# Patient Record
Sex: FEMALE | Race: OTHER | Employment: OTHER | URBAN - METROPOLITAN AREA
[De-identification: names, ages, dates, MRNs, and addresses within clinical notes are randomized per-mention and may not be internally consistent; named-entity substitution may affect disease eponyms.]

---

## 2017-02-27 ENCOUNTER — PATIENT MESSAGE (OUTPATIENT)
Dept: FAMILY MEDICINE CLINIC | Age: 57
End: 2017-02-27

## 2017-02-27 RX ORDER — LISINOPRIL 2.5 MG/1
2.5 TABLET ORAL DAILY
Qty: 90 TAB | Refills: 0 | Status: SHIPPED | OUTPATIENT
Start: 2017-02-27 | End: 2017-05-22 | Stop reason: SDUPTHER

## 2017-02-27 NOTE — LETTER
3/13/2017 7:54 AM 
 
Ms. [address] [place] [id number] Mrs. Motley Bright  
Dr. Mariano Strong authorize your prescription, however you are overdue for an office visit, particularly because we started a new diabetes medication back in May of 2016, and we needed to follow-up on that 3mo afterwards (Aug 2016).    
Please call to schedule an appointment. [phone] Sincerely, Priya Diaz LPN

## 2017-02-27 NOTE — TELEPHONE ENCOUNTER
Please call Dagoberto Mcdonnell. I did authorize her prescription, however she is overdue for an office visit, particularly because we started a new diabetes medication back in May of 2016, and we needed to follow-up on that 3mo afterwards (Aug 2016!!). Please ask her to schedule an appointment. Orders Placed This Encounter    lisinopril (PRINIVIL, ZESTRIL) 2.5 mg tablet     Sig: Take 1 Tab by mouth daily. Dispense:  90 Tab     Refill:  0     Needs office visit for further refills. No future appointments.

## 2017-05-22 DIAGNOSIS — F41.9 ANXIETY AND DEPRESSION: ICD-10-CM

## 2017-05-22 DIAGNOSIS — E11.9 DIABETES MELLITUS TYPE 2, CONTROLLED (HCC): ICD-10-CM

## 2017-05-22 DIAGNOSIS — F32.A ANXIETY AND DEPRESSION: ICD-10-CM

## 2017-05-22 RX ORDER — TRAZODONE HYDROCHLORIDE 50 MG/1
50-100 TABLET ORAL
Qty: 180 TAB | Refills: 1 | Status: SHIPPED | OUTPATIENT
Start: 2017-05-22 | End: 2019-07-01

## 2017-05-22 RX ORDER — GLIPIZIDE 10 MG/1
10 TABLET ORAL 2 TIMES DAILY
Qty: 180 TAB | Refills: 1 | Status: SHIPPED | OUTPATIENT
Start: 2017-05-22 | End: 2017-05-24 | Stop reason: SDUPTHER

## 2017-05-22 RX ORDER — LISINOPRIL 2.5 MG/1
2.5 TABLET ORAL DAILY
Qty: 90 TAB | Refills: 1 | Status: SHIPPED | OUTPATIENT
Start: 2017-05-22 | End: 2017-05-24 | Stop reason: SDUPTHER

## 2017-05-22 RX ORDER — ATORVASTATIN CALCIUM 40 MG/1
40 TABLET, FILM COATED ORAL DAILY
Qty: 90 TAB | Refills: 1 | Status: SHIPPED | OUTPATIENT
Start: 2017-05-22 | End: 2017-05-24 | Stop reason: SDUPTHER

## 2017-05-22 RX ORDER — METFORMIN HYDROCHLORIDE 1000 MG/1
1000 TABLET ORAL 2 TIMES DAILY WITH MEALS
Qty: 180 TAB | Refills: 1 | Status: SHIPPED | OUTPATIENT
Start: 2017-05-22 | End: 2017-05-24 | Stop reason: SDUPTHER

## 2017-05-22 NOTE — TELEPHONE ENCOUNTER
Orders Placed This Encounter    traZODone (DESYREL) 50 mg tablet     Sig: Take 1-2 Tabs by mouth nightly. Dispense:  180 Tab     Refill:  1     Needs appt for further refills    atorvastatin (LIPITOR) 40 mg tablet     Sig: Take 1 Tab by mouth daily. Dispense:  90 Tab     Refill:  1     Needs appt for further refills    glipiZIDE (GLUCOTROL) 10 mg tablet     Sig: Take 1 Tab by mouth two (2) times a day. Dispense:  180 Tab     Refill:  1     Needs appt for further refills    metFORMIN (GLUCOPHAGE) 1,000 mg tablet     Sig: Take 1 Tab by mouth two (2) times daily (with meals). Dispense:  180 Tab     Refill:  1     Needs appt for further refills    lisinopril (PRINIVIL, ZESTRIL) 2.5 mg tablet     Sig: Take 1 Tab by mouth daily. Dispense:  90 Tab     Refill:  1     Needs office visit for further refills.

## 2017-05-22 NOTE — TELEPHONE ENCOUNTER
Last seen 5/27/16    Last filled Lisinopril 2/27/17  Glipizide & Metformin 9/12/16  Lipitor 7/20/16  Trazodone 6/17/16

## 2017-05-22 NOTE — TELEPHONE ENCOUNTER
From: Ar Currie  To: Viviana Porter MD  Sent: 5/22/2017 9:30 AM EDT  Subject: Medication Renewal Request    Original authorizing provider: MD Ar Lizama would like a refill of the following medications:  traZODone (DESYREL) 50 mg tablet [Terri Lee MD]  atorvastatin (LIPITOR) 40 mg tablet [Terri Lee MD]  glipiZIDE (GLUCOTROL) 10 mg tablet [Terri Lee MD]  metFORMIN (GLUCOPHAGE) 1,000 mg tablet [Terri Lee MD]  lisinopril (PRINIVIL, ZESTRIL) 2.5 mg tablet Viviana Porter MD]    Preferred pharmacy: [facility] [address] [place] [address].     Comment:

## 2017-05-24 RX ORDER — GLIPIZIDE 10 MG/1
10 TABLET ORAL 2 TIMES DAILY
Qty: 60 TAB | Refills: 0 | Status: SHIPPED | OUTPATIENT
Start: 2017-05-24 | End: 2022-09-16 | Stop reason: ALTCHOICE

## 2017-05-24 RX ORDER — LISINOPRIL 2.5 MG/1
2.5 TABLET ORAL DAILY
Qty: 30 TAB | Refills: 0 | Status: SHIPPED | OUTPATIENT
Start: 2017-05-24 | End: 2019-07-01

## 2017-05-24 RX ORDER — ATORVASTATIN CALCIUM 40 MG/1
40 TABLET, FILM COATED ORAL DAILY
Qty: 30 TAB | Refills: 0 | Status: SHIPPED | OUTPATIENT
Start: 2017-05-24

## 2017-05-24 RX ORDER — METFORMIN HYDROCHLORIDE 1000 MG/1
1000 TABLET ORAL 2 TIMES DAILY WITH MEALS
Qty: 60 TAB | Refills: 0 | Status: SHIPPED | OUTPATIENT
Start: 2017-05-24 | End: 2022-09-15 | Stop reason: ALTCHOICE

## 2017-05-24 NOTE — TELEPHONE ENCOUNTER
Called patient to inform her that her medications are in and to get fasting blood work done before her appointment that is on 06/01/2017. Patient didn't answer. Voicemail left stating to give us a call back at her earliest convenience at [phone].

## 2017-05-24 NOTE — TELEPHONE ENCOUNTER
Please call Elias Nelly. A 30d supply of her medications has been authorized. Please ask her to come for FASTING bloodwork prior to her scheduled appointment. Orders have been placed for labs. Future Appointments  Date Time Provider Jacklyn Gifford   6/1/2017 8:00 AM MD LJ Bloom SCHED       Orders Placed This Encounter    atorvastatin (LIPITOR) 40 mg tablet     Sig: Take 1 Tab by mouth daily. Dispense:  30 Tab     Refill:  0     Needs appt for further refills    metFORMIN (GLUCOPHAGE) 1,000 mg tablet     Sig: Take 1 Tab by mouth two (2) times daily (with meals). Dispense:  60 Tab     Refill:  0     Needs appt for further refills    glipiZIDE (GLUCOTROL) 10 mg tablet     Sig: Take 1 Tab by mouth two (2) times a day. Dispense:  60 Tab     Refill:  0     Needs appt for further refills    lisinopril (PRINIVIL, ZESTRIL) 2.5 mg tablet     Sig: Take 1 Tab by mouth daily. Dispense:  30 Tab     Refill:  0     Needs office visit for further refills.

## 2017-05-31 LAB
25(OH)D3 SERPL-MCNC: 18.1 NG/ML (ref 32–100)
A-G RATIO,AGRAT: 2 RATIO (ref 1.1–2.6)
ALBUMIN SERPL-MCNC: 4.1 G/DL (ref 3.5–5)
ALP SERPL-CCNC: 142 U/L (ref 25–115)
ALT SERPL-CCNC: 57 U/L (ref 5–40)
ANION GAP SERPL CALC-SCNC: 15 MMOL/L
AST SERPL W P-5'-P-CCNC: 42 U/L (ref 10–37)
AVG GLU, 10930: 212 MG/DL (ref 91–123)
BILIRUB SERPL-MCNC: 0.2 MG/DL (ref 0.2–1.2)
BUN SERPL-MCNC: 17 MG/DL (ref 6–22)
CALCIUM SERPL-MCNC: 9.3 MG/DL (ref 8.4–10.5)
CHLORIDE SERPL-SCNC: 104 MMOL/L (ref 98–110)
CHOLEST SERPL-MCNC: 135 MG/DL (ref 110–200)
CO2 SERPL-SCNC: 23 MMOL/L (ref 20–32)
CREAT SERPL-MCNC: 0.5 MG/DL (ref 0.5–1.2)
GFRAA, 66117: >60
GFRNA, 66118: >60
GLOBULIN,GLOB: 2.1 G/DL (ref 2–4)
GLUCOSE SERPL-MCNC: 128 MG/DL (ref 65–99)
HBA1C MFR BLD HPLC: 9 % (ref 4.8–5.9)
HDLC SERPL-MCNC: 35 MG/DL (ref 40–59)
LDLC SERPL CALC-MCNC: 70 MG/DL (ref 50–99)
POTASSIUM SERPL-SCNC: 4.5 MMOL/L (ref 3.5–5.5)
PROT SERPL-MCNC: 6.2 G/DL (ref 6.4–8.3)
SODIUM SERPL-SCNC: 142 MMOL/L (ref 133–145)
T4 FREE SERPL-MCNC: 1 NG/DL (ref 0.9–1.8)
TRIGL SERPL-MCNC: 153 MG/DL (ref 40–149)
TSH SERPL DL<=0.005 MIU/L-ACNC: 2.66 MCU/ML (ref 0.27–4.2)
VLDLC SERPL CALC-MCNC: 31 MG/DL (ref 8–30)

## 2017-08-29 NOTE — PROGRESS NOTES
I was reviewing chart to see if an appointment has been rescheduled. Her chart has now been inactivated in [place]. I will no longer try to make contact with this patient.

## 2019-05-15 ENCOUNTER — HOSPITAL ENCOUNTER (OUTPATIENT)
Dept: MAMMOGRAPHY | Age: 59
Discharge: HOME | End: 2019-05-15
Attending: NURSE PRACTITIONER
Payer: COMMERCIAL

## 2019-05-15 DIAGNOSIS — Z12.31 VISIT FOR SCREENING MAMMOGRAM: ICD-10-CM

## 2019-05-15 PROCEDURE — 77063 BREAST TOMOSYNTHESIS BI: CPT

## 2020-06-10 ENCOUNTER — HOSPITAL ENCOUNTER (OUTPATIENT)
Dept: MAMMOGRAPHY | Age: 60
Discharge: HOME | End: 2020-06-10
Attending: NURSE PRACTITIONER
Payer: COMMERCIAL

## 2020-06-10 DIAGNOSIS — Z12.31 VISIT FOR SCREENING MAMMOGRAM: ICD-10-CM

## 2020-06-10 PROCEDURE — 77063 BREAST TOMOSYNTHESIS BI: CPT

## 2021-06-10 ENCOUNTER — TRANSCRIBE ORDER (OUTPATIENT)
Dept: SCHEDULING | Age: 61
End: 2021-06-10

## 2021-06-10 DIAGNOSIS — Z12.31 VISIT FOR SCREENING MAMMOGRAM: Primary | ICD-10-CM

## 2021-06-17 ENCOUNTER — HOSPITAL ENCOUNTER (OUTPATIENT)
Dept: WOMENS IMAGING | Age: 61
Discharge: HOME | End: 2021-06-17
Attending: INTERNAL MEDICINE
Payer: COMMERCIAL

## 2021-06-17 DIAGNOSIS — Z12.31 VISIT FOR SCREENING MAMMOGRAM: ICD-10-CM

## 2021-06-17 PROCEDURE — 77063 BREAST TOMOSYNTHESIS BI: CPT

## 2022-02-22 ENCOUNTER — TRANSCRIBE ORDER (OUTPATIENT)
Dept: SCHEDULING | Age: 62
End: 2022-02-22

## 2022-02-22 DIAGNOSIS — Z12.31 SCREENING MAMMOGRAM, ENCOUNTER FOR: Primary | ICD-10-CM

## 2022-04-21 ENCOUNTER — NEW PATIENT (OUTPATIENT)
Dept: URBAN - METROPOLITAN AREA CLINIC 1 | Facility: CLINIC | Age: 62
End: 2022-04-21

## 2022-04-21 DIAGNOSIS — H40.033: ICD-10-CM

## 2022-04-21 DIAGNOSIS — H25.813: ICD-10-CM

## 2022-04-21 DIAGNOSIS — E11.9: ICD-10-CM

## 2022-04-21 DIAGNOSIS — H49.22: ICD-10-CM

## 2022-04-21 PROCEDURE — 92015 DETERMINE REFRACTIVE STATE: CPT

## 2022-04-21 PROCEDURE — 92004 COMPRE OPH EXAM NEW PT 1/>: CPT

## 2022-04-21 ASSESSMENT — TONOMETRY
OD_IOP_MMHG: 14
OS_IOP_MMHG: 14

## 2022-04-21 ASSESSMENT — VISUAL ACUITY
OD_BAT: 20/40
OD_CC: 20/25
OS_CC: J1
OD_CC: J1
OS_CC: 20/20
OS_BAT: 20/40

## 2022-04-26 ENCOUNTER — PRE-OP/H&P (OUTPATIENT)
Dept: URBAN - METROPOLITAN AREA CLINIC 1 | Facility: CLINIC | Age: 62
End: 2022-04-26

## 2022-04-26 VITALS
HEIGHT: 64 IN | BODY MASS INDEX: 25.44 KG/M2 | HEART RATE: 78 BPM | WEIGHT: 149 LBS | SYSTOLIC BLOOD PRESSURE: 125 MMHG | DIASTOLIC BLOOD PRESSURE: 85 MMHG

## 2022-04-26 DIAGNOSIS — H25.813: ICD-10-CM

## 2022-04-26 PROCEDURE — 92136 OPHTHALMIC BIOMETRY: CPT

## 2022-04-26 ASSESSMENT — VISUAL ACUITY
OS_SC: 20/150
OD_BAT: 20/60
OD_SC: 20/100
OS_BAT: 20/60

## 2022-05-04 ENCOUNTER — SURGERY/PROCEDURE (OUTPATIENT)
Dept: URBAN - METROPOLITAN AREA SURGERY 1 | Facility: SURGERY | Age: 62
End: 2022-05-04

## 2022-05-04 PROCEDURE — 66984 XCAPSL CTRC RMVL W/O ECP: CPT

## 2022-05-05 ENCOUNTER — POST-OP (OUTPATIENT)
Dept: URBAN - METROPOLITAN AREA CLINIC 1 | Facility: CLINIC | Age: 62
End: 2022-05-05

## 2022-05-05 DIAGNOSIS — Z96.1: ICD-10-CM

## 2022-05-05 PROCEDURE — 99024 POSTOP FOLLOW-UP VISIT: CPT

## 2022-05-05 ASSESSMENT — TONOMETRY: OS_IOP_MMHG: 14

## 2022-05-05 ASSESSMENT — VISUAL ACUITY: OS_SC: 20/30

## 2022-05-05 NOTE — PATIENT DISCUSSION
Use Zylet BID OS and Prolensa QD OS gtts through completion of PO gtt chart regimen/ Per our instructions given to patient.

## 2022-05-07 ENCOUNTER — TRANSCRIBE ORDER (OUTPATIENT)
Dept: SCHEDULING | Age: 62
End: 2022-05-07

## 2022-05-07 DIAGNOSIS — Z78.0 POSTMENOPAUSAL: Primary | ICD-10-CM

## 2022-05-09 ENCOUNTER — POST OP/EVAL OF SECOND EYE (OUTPATIENT)
Dept: URBAN - METROPOLITAN AREA CLINIC 1 | Facility: CLINIC | Age: 62
End: 2022-05-09

## 2022-05-09 VITALS — SYSTOLIC BLOOD PRESSURE: 106 MMHG | DIASTOLIC BLOOD PRESSURE: 66 MMHG | HEIGHT: 55 IN | HEART RATE: 74 BPM

## 2022-05-09 DIAGNOSIS — Z96.1: ICD-10-CM

## 2022-05-09 PROCEDURE — 99024 POSTOP FOLLOW-UP VISIT: CPT

## 2022-05-09 ASSESSMENT — VISUAL ACUITY
OS_SC: 20/20
OD_CC: 20/25
OD_BAT: 20/60
OD_CC: J1
OD_SC: 20/100

## 2022-05-09 ASSESSMENT — TONOMETRY
OS_IOP_MMHG: 12
OD_IOP_MMHG: 13

## 2022-05-09 NOTE — PATIENT DISCUSSION
Visually Significant (secondary to glare), discussed the risks, benefits, alternatives, and limitations of cataract surgery. The patient stated a full understanding and a desire to proceed with the procedure. The patient will need to return for pre-op appointment with cataract measurements and to have any additional questions answered, and start pre-operative eye drops as directed. *guarded prognosis secondary to Fuchs.

## 2022-05-18 ENCOUNTER — SURGERY/PROCEDURE (OUTPATIENT)
Dept: URBAN - METROPOLITAN AREA SURGERY 1 | Facility: SURGERY | Age: 62
End: 2022-05-18

## 2022-05-18 PROCEDURE — 66984 XCAPSL CTRC RMVL W/O ECP: CPT

## 2022-05-19 ENCOUNTER — POST-OP (OUTPATIENT)
Dept: URBAN - METROPOLITAN AREA CLINIC 1 | Facility: CLINIC | Age: 62
End: 2022-05-19

## 2022-05-19 DIAGNOSIS — Z96.1: ICD-10-CM

## 2022-05-19 PROCEDURE — 99024 POSTOP FOLLOW-UP VISIT: CPT

## 2022-05-19 ASSESSMENT — VISUAL ACUITY
OD_SC: 20/20
OS_SC: 20/20

## 2022-05-19 ASSESSMENT — TONOMETRY
OD_IOP_MMHG: 14
OS_IOP_MMHG: 14

## 2022-05-19 NOTE — PATIENT DISCUSSION
Use Zylet BID OD and Prolensa QD OD through completion of PO gtt chart regimen/ Per our instructions given to patient.

## 2022-06-13 ENCOUNTER — POST-OP (OUTPATIENT)
Dept: URBAN - METROPOLITAN AREA CLINIC 1 | Facility: CLINIC | Age: 62
End: 2022-06-13

## 2022-06-13 DIAGNOSIS — Z96.1: ICD-10-CM

## 2022-06-13 PROCEDURE — 99024 POSTOP FOLLOW-UP VISIT: CPT

## 2022-06-13 ASSESSMENT — TONOMETRY
OD_IOP_MMHG: 12
OS_IOP_MMHG: 12

## 2022-06-13 ASSESSMENT — VISUAL ACUITY
OS_SC: 20/25
OD_SC: 20/25

## 2022-06-20 ENCOUNTER — HOSPITAL ENCOUNTER (OUTPATIENT)
Dept: WOMENS IMAGING | Age: 62
Discharge: HOME | End: 2022-06-20
Attending: INTERNAL MEDICINE
Payer: COMMERCIAL

## 2022-06-20 DIAGNOSIS — Z12.31 SCREENING MAMMOGRAM, ENCOUNTER FOR: ICD-10-CM

## 2022-06-20 PROCEDURE — 77063 BREAST TOMOSYNTHESIS BI: CPT

## 2022-06-21 ENCOUNTER — HOSPITAL ENCOUNTER (OUTPATIENT)
Dept: BONE DENSITY | Age: 62
Discharge: HOME | End: 2022-06-21
Attending: NURSE PRACTITIONER
Payer: COMMERCIAL

## 2022-06-21 DIAGNOSIS — Z78.0 POSTMENOPAUSAL: ICD-10-CM

## 2022-06-21 PROCEDURE — 77080 DXA BONE DENSITY AXIAL: CPT

## 2022-09-16 ENCOUNTER — OFFICE VISIT (OUTPATIENT)
Dept: FAMILY MEDICINE CLINIC | Age: 62
End: 2022-09-16
Payer: COMMERCIAL

## 2022-09-16 VITALS
TEMPERATURE: 98.2 F | DIASTOLIC BLOOD PRESSURE: 62 MMHG | SYSTOLIC BLOOD PRESSURE: 113 MMHG | OXYGEN SATURATION: 95 % | BODY MASS INDEX: 25.23 KG/M2 | HEIGHT: 64 IN | WEIGHT: 147.8 LBS | HEART RATE: 67 BPM | RESPIRATION RATE: 18 BRPM

## 2022-09-16 DIAGNOSIS — Z12.11 COLON CANCER SCREENING: ICD-10-CM

## 2022-09-16 DIAGNOSIS — R63.4 WEIGHT LOSS: ICD-10-CM

## 2022-09-16 DIAGNOSIS — G45.3 AMAUROSIS FUGAX OF LEFT EYE: Primary | ICD-10-CM

## 2022-09-16 DIAGNOSIS — E11.8 CONTROLLED TYPE 2 DIABETES MELLITUS WITH COMPLICATION, WITHOUT LONG-TERM CURRENT USE OF INSULIN (HCC): ICD-10-CM

## 2022-09-16 DIAGNOSIS — F17.200 SMOKER: ICD-10-CM

## 2022-09-16 DIAGNOSIS — E78.5 HYPERLIPIDEMIA WITH TARGET LDL LESS THAN 100: ICD-10-CM

## 2022-09-16 DIAGNOSIS — Z13.89 SCREENING FOR HEMATURIA OR PROTEINURIA: ICD-10-CM

## 2022-09-16 LAB — HBA1C MFR BLD HPLC: 6.6 %

## 2022-09-16 PROCEDURE — 83036 HEMOGLOBIN GLYCOSYLATED A1C: CPT | Performed by: NURSE PRACTITIONER

## 2022-09-16 PROCEDURE — 99204 OFFICE O/P NEW MOD 45 MIN: CPT | Performed by: NURSE PRACTITIONER

## 2022-09-16 PROCEDURE — 3044F HG A1C LEVEL LT 7.0%: CPT | Performed by: NURSE PRACTITIONER

## 2022-09-16 RX ORDER — EMPAGLIFLOZIN AND METFORMIN HYDROCHLORIDE 12.5; 1 MG/1; MG/1
TABLET ORAL
Qty: 120 TABLET | Refills: 1 | Status: SHIPPED | OUTPATIENT
Start: 2022-09-16

## 2022-09-16 RX ORDER — EMPAGLIFLOZIN AND METFORMIN HYDROCHLORIDE 12.5; 1 MG/1; MG/1
TABLET ORAL
COMMUNITY
Start: 2022-07-15 | End: 2022-09-16 | Stop reason: SDUPTHER
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
## 2022-09-16 NOTE — PROGRESS NOTES
Patient will bring covid vaccine card next visit. Ryan Moody presents today for   Chief Complaint   Patient presents with    [address] is double     Weight Loss       Is someone accompanying this pt? no    Is the patient using any DME equipment during OV? no    Depression Screening:  3 most recent PHQ Screens 9/16/2022   PHQ Not Done -   Little interest or pleasure in doing things Not at all   Feeling down, depressed, irritable, or hopeless Not at all   Total Score PHQ 2 0       Learning Assessment:  Learning Assessment 11/19/2015   PRIMARY LEARNER Patient   HIGHEST LEVEL OF EDUCATION - PRIMARY LEARNER  SOME COLLEGE   BARRIERS PRIMARY LEARNER NONE   CO-LEARNER CAREGIVER No   PRIMARY LANGUAGE Japanese   LEARNER PREFERENCE PRIMARY VIDEOS   ANSWERED BY self   RELATIONSHIP SELF       Fall Risk  No flowsheet data found. ADL  No flowsheet data found. Health Maintenance reviewed and discussed and ordered per Provider. Health Maintenance Due   Topic Date Due    COVID-19 Vaccine (1) Never done    Depression Monitoring  Never done    Shingrix Vaccine Age 50> (1 of 2) Never done    MICROALBUMIN Q1  11/19/2016    Foot Exam Q1  11/21/2016    A1C test (Diabetic or Prediabetic)  05/31/2018    Eye Exam Retinal or Dilated  06/29/2018    Colorectal Cancer Screening Combo  04/25/2019    Lipid Screen  04/25/2019    Cervical cancer screen  05/27/2021    Flu Vaccine (1) 09/01/2022   . Coordination of Care:  1. Have you been to the ER, urgent care clinic since your last visit? Hospitalized since your last visit? Yes [facility] eye problem     2. Have you seen or consulted any other health care providers outside of the [address] [place] [address] since your last visit? Include any pap smears or colon screening. no         3. For patients aged 39-70: Has the patient had a colonoscopy / FIT/ Cologuard? No      If the patient is female:    4.  For patients aged 41-77: Has the patient had a mammogram within the past 2 years? Yes - no Care Gap present      5. For patients aged 21-65: Has the patient had a pap smear?  No

## 2022-09-17 LAB
ALBUMIN SERPL-MCNC: 4.1 G/DL (ref 3.8–4.8)
ALBUMIN/CREAT UR: <19 MG/G CREAT (ref 0–29)
ALBUMIN/GLOB SERPL: 2 {RATIO} (ref 1.2–2.2)
ALP SERPL-CCNC: 116 IU/L (ref 44–121)
ALT SERPL-CCNC: 18 IU/L (ref 0–32)
APPEARANCE UR: CLEAR
AST SERPL-CCNC: 27 IU/L (ref 0–40)
BASOPHILS # BLD AUTO: 0 X10E3/UL (ref 0–0.2)
BASOPHILS NFR BLD AUTO: 0 %
BILIRUB SERPL-MCNC: 0.4 MG/DL (ref 0–1.2)
BILIRUB UR QL STRIP: NEGATIVE
BUN SERPL-MCNC: 15 MG/DL (ref 8–27)
BUN/CREAT SERPL: 28 (ref 12–28)
CALCIUM SERPL-MCNC: 9.3 MG/DL (ref 8.7–10.3)
CHLORIDE SERPL-SCNC: 102 MMOL/L (ref 96–106)
CHOLEST SERPL-MCNC: 147 MG/DL (ref 100–199)
CO2 SERPL-SCNC: 22 MMOL/L (ref 20–29)
COLOR UR: YELLOW
CREAT SERPL-MCNC: 0.53 MG/DL (ref 0.57–1)
CREAT UR-MCNC: 15.9 MG/DL
EGFR: 103 ML/MIN/1.73
EOSINOPHIL # BLD AUTO: 0.2 X10E3/UL (ref 0–0.4)
EOSINOPHIL NFR BLD AUTO: 3 %
ERYTHROCYTE [DISTWIDTH] IN BLOOD BY AUTOMATED COUNT: 13.6 % (ref 11.7–15.4)
GLOBULIN SER CALC-MCNC: 2.1 G/DL (ref 1.5–4.5)
GLUCOSE SERPL-MCNC: 125 MG/DL (ref 65–99)
GLUCOSE UR QL STRIP: ABNORMAL
HCT VFR BLD AUTO: 42.5 % (ref 34–46.6)
HDLC SERPL-MCNC: 45 MG/DL
HGB BLD-MCNC: 14.2 G/DL (ref 11.1–15.9)
HGB UR QL STRIP: NEGATIVE
IMM GRANULOCYTES # BLD AUTO: 0 X10E3/UL (ref 0–0.1)
IMM GRANULOCYTES NFR BLD AUTO: 0 %
IMP & REVIEW OF LAB RESULTS: NORMAL
KETONES UR QL STRIP: NEGATIVE
LDLC SERPL CALC-MCNC: 86 MG/DL (ref 0–99)
LEUKOCYTE ESTERASE UR QL STRIP: NEGATIVE
LYMPHOCYTES # BLD AUTO: 2.8 X10E3/UL (ref 0.7–3.1)
LYMPHOCYTES NFR BLD AUTO: 35 %
MCH RBC QN AUTO: 30.4 PG (ref 26.6–33)
MCHC RBC AUTO-ENTMCNC: 33.4 G/DL (ref 31.5–35.7)
MCV RBC AUTO: 91 FL (ref 79–97)
MICRO URNS: ABNORMAL
MICROALBUMIN UR-MCNC: <3 UG/ML
MONOCYTES # BLD AUTO: 0.6 X10E3/UL (ref 0.1–0.9)
MONOCYTES NFR BLD AUTO: 7 %
NEUTROPHILS # BLD AUTO: 4.5 X10E3/UL (ref 1.4–7)
NEUTROPHILS NFR BLD AUTO: 55 %
NITRITE UR QL STRIP: NEGATIVE
PH UR STRIP: 5 [PH] (ref 5–7.5)
PLATELET # BLD AUTO: 260 X10E3/UL (ref 150–450)
POTASSIUM SERPL-SCNC: 4 MMOL/L (ref 3.5–5.2)
PROT SERPL-MCNC: 6.2 G/DL (ref 6–8.5)
PROT UR QL STRIP: NEGATIVE
RBC # BLD AUTO: 4.67 X10E6/UL (ref 3.77–5.28)
SODIUM SERPL-SCNC: 138 MMOL/L (ref 134–144)
SP GR UR STRIP: 1.02 (ref 1–1.03)
TRIGL SERPL-MCNC: 84 MG/DL (ref 0–149)
TSH SERPL DL<=0.005 MIU/L-ACNC: 1.65 UIU/ML (ref 0.45–4.5)
UROBILINOGEN UR STRIP-MCNC: 0.2 MG/DL (ref 0.2–1)
VLDLC SERPL CALC-MCNC: 16 MG/DL (ref 5–40)
WBC # BLD AUTO: 8.2 X10E3/UL (ref 3.4–10.8)

## 2022-10-07 NOTE — PROGRESS NOTES
Can you please call patient to let the patient know that her labs are abnormal. Patient has glucose in her urine. Cholesterol looks great and pt is not anemic. Glucose (sugar) elevated in labs and liver tests are normal.   Thanks!

## 2022-10-10 NOTE — PROGRESS NOTES
1st attempt to call patient to inform of labs results. No answer; left voicemail or patient to call office back.

## 2022-10-13 DIAGNOSIS — G45.3 AMAUROSIS FUGAX OF LEFT EYE: Primary | ICD-10-CM

## 2022-10-13 NOTE — PROGRESS NOTES
CT head denied by insurance and MRI of head with/without contrast should be completed. MRI with/without contrast ordered instead of CT head with/without. Received authorization number from [address] [place] [address] Loop: [id number] from 10/13/22-12/12/2022. [facility] [place] radiology department and provided authorization number to change CT of head to MRI brain .      Notified Alma Dangelo with [address] [place] [address] radiology to change test form CT to MRI with/without contrast.

## 2022-10-17 ENCOUNTER — TELEPHONE (OUTPATIENT)
Dept: FAMILY MEDICINE CLINIC | Age: 62
End: 2022-10-17

## 2022-10-17 NOTE — TELEPHONE ENCOUNTER
----- Message from Priscilla Reid sent at 10/17/2022 11:15 AM EDT -----  Subject: Message to Provider    QUESTIONS  Information for Provider? Patient seen Angel Lezama NP and Norma Naidu order the CT SCAN Patient fax some paperwork on10/14/22 2:50pm sent   2 pages , the [facility] the diagnosis from the CT SCAN summary   [employer] please call patient back regarding a fax she faxed to the office   not sure exact day believe it was Friday 10/14/22 want to know if office   received the fax, patient want to know if she need another CT SCAN please   call patient for further details  ---------------------------------------------------------------------------  --------------  [facility]  [phone]; OK to leave message on Sw[employer], Jacklyn Haines to respond with   electronic message via [employer] portal (only for patients who have   registered [employer] account)  ---------------------------------------------------------------------------  --------------  SCRIPT ANSWERS  Relationship to Patient?  Self

## 2022-10-18 NOTE — TELEPHONE ENCOUNTER
Tried calling patient back in regards to message, no answer; left voicemail for patient to call office back.

## 2022-11-15 RX ORDER — ATORVASTATIN CALCIUM 40 MG/1
40 TABLET, FILM COATED ORAL DAILY
Qty: 30 TABLET | Refills: 0 | Status: SHIPPED | OUTPATIENT
Start: 2022-11-15

## 2022-11-15 RX ORDER — PAROXETINE 10 MG/1
10 TABLET, FILM COATED ORAL DAILY
Qty: 90 TABLET | Refills: 1 | Status: SHIPPED | OUTPATIENT
Start: 2022-11-15

## 2022-12-01 ENCOUNTER — HOSPITAL ENCOUNTER (OUTPATIENT)
Dept: MRI IMAGING | Age: 62
Discharge: HOME | End: 2022-12-01
Attending: NURSE PRACTITIONER
Payer: COMMERCIAL

## 2022-12-01 ENCOUNTER — TELEPHONE (OUTPATIENT)
Dept: FAMILY MEDICINE CLINIC | Age: 62
End: 2022-12-01

## 2022-12-01 DIAGNOSIS — G45.3 AMAUROSIS FUGAX OF LEFT EYE: ICD-10-CM

## 2022-12-01 PROCEDURE — 70553 MRI BRAIN STEM W/O & W/DYE: CPT

## 2022-12-01 PROCEDURE — 74011250636 HC RX REV CODE- 250/636: Performed by: NURSE PRACTITIONER

## 2022-12-01 PROCEDURE — A9575 INJ GADOTERATE MEGLUMI 0.1ML: HCPCS | Performed by: NURSE PRACTITIONER

## 2022-12-01 RX ADMIN — GADOTERATE MEGLUMINE 14 ML: 376.9 INJECTION INTRAVENOUS at 15:47

## 2022-12-01 NOTE — TELEPHONE ENCOUNTER
----- Message from Ap Oden sent at 12/1/2022 10:44 AM EST -----  Subject: Message to Provider    QUESTIONS  Information for Provider?  REC'D PHONE CALL ABOUT COMING IN EARLIER FOR   APPT TODAY 12/01 @3:30 PT CAN COME IN JUST NEEDS TO KNOW WHAT TIME TO BE   THERE CAN LEAVE TIME ON VOICEMAIL   ---------------------------------------------------------------------------  --------------  Tiffanie CANTU  [phone]; OK to leave message on voicemail  ---------------------------------------------------------------------------  --------------  SCRIPT ANSWERS  undefined

## 2022-12-01 NOTE — TELEPHONE ENCOUNTER
Patient has an appointment MRI of the brain at [facility]. I do not know what time her appt is but her appt is not with [facility].

## 2022-12-16 NOTE — PROGRESS NOTES
Can you please call patient to let the patient know that her MRI brain is normal with no acute stroke, lesion or mass. Thanks!

## 2023-02-09 ENCOUNTER — OFFICE VISIT (OUTPATIENT)
Dept: FAMILY MEDICINE CLINIC | Age: 63
End: 2023-02-09
Payer: COMMERCIAL

## 2023-02-09 VITALS
BODY MASS INDEX: 24.55 KG/M2 | SYSTOLIC BLOOD PRESSURE: 119 MMHG | DIASTOLIC BLOOD PRESSURE: 71 MMHG | TEMPERATURE: 98.3 F | RESPIRATION RATE: 18 BRPM | OXYGEN SATURATION: 95 % | HEIGHT: 64 IN | HEART RATE: 76 BPM | WEIGHT: 143.8 LBS

## 2023-02-09 DIAGNOSIS — F32.0 CURRENT MILD EPISODE OF MAJOR DEPRESSIVE DISORDER, UNSPECIFIED WHETHER RECURRENT (HCC): ICD-10-CM

## 2023-02-09 DIAGNOSIS — F17.200 SMOKER: ICD-10-CM

## 2023-02-09 DIAGNOSIS — E78.5 HYPERLIPIDEMIA WITH TARGET LDL LESS THAN 100: ICD-10-CM

## 2023-02-09 DIAGNOSIS — Z12.11 COLON CANCER SCREENING: ICD-10-CM

## 2023-02-09 DIAGNOSIS — Z87.891 PERSONAL HISTORY OF TOBACCO USE, PRESENTING HAZARDS TO HEALTH: ICD-10-CM

## 2023-02-09 DIAGNOSIS — F43.21 GRIEF: ICD-10-CM

## 2023-02-09 DIAGNOSIS — R63.4 WEIGHT LOSS: ICD-10-CM

## 2023-02-09 DIAGNOSIS — Z13.89 SCREENING FOR HEMATURIA OR PROTEINURIA: ICD-10-CM

## 2023-02-09 DIAGNOSIS — M81.0 OSTEOPOROSIS WITHOUT CURRENT PATHOLOGICAL FRACTURE, UNSPECIFIED OSTEOPOROSIS TYPE: ICD-10-CM

## 2023-02-09 DIAGNOSIS — E11.8 CONTROLLED TYPE 2 DIABETES MELLITUS WITH COMPLICATION, WITHOUT LONG-TERM CURRENT USE OF INSULIN (HCC): Primary | ICD-10-CM

## 2023-02-09 DIAGNOSIS — E55.9 VITAMIN D DEFICIENCY: ICD-10-CM

## 2023-02-09 DIAGNOSIS — Z13.0 SCREENING FOR DEFICIENCY ANEMIA: ICD-10-CM

## 2023-02-09 PROCEDURE — G0296 VISIT TO DETERM LDCT ELIG: HCPCS | Performed by: NURSE PRACTITIONER

## 2023-02-09 PROCEDURE — 99214 OFFICE O/P EST MOD 30 MIN: CPT | Performed by: NURSE PRACTITIONER

## 2023-02-09 PROCEDURE — 3051F HG A1C>EQUAL 7.0%<8.0%: CPT | Performed by: NURSE PRACTITIONER

## 2023-02-09 RX ORDER — PAROXETINE 10 MG/1
10 TABLET, FILM COATED ORAL DAILY
Qty: 90 TABLET | Refills: 1 | Status: SHIPPED | OUTPATIENT
Start: 2023-02-09

## 2023-02-09 RX ORDER — ATORVASTATIN CALCIUM 40 MG/1
40 TABLET, FILM COATED ORAL DAILY
Qty: 30 TABLET | Refills: 0 | Status: SHIPPED | OUTPATIENT
Start: 2023-02-09 | End: 2023-02-09

## 2023-02-09 RX ORDER — ALENDRONATE SODIUM 70 MG/1
TABLET ORAL
Qty: 12 TABLET | Refills: 1 | Status: SHIPPED | OUTPATIENT
Start: 2023-02-09

## 2023-02-09 RX ORDER — ATORVASTATIN CALCIUM 40 MG/1
40 TABLET, FILM COATED ORAL DAILY
Qty: 90 TABLET | Refills: 1 | Status: SHIPPED | OUTPATIENT
Start: 2023-02-09

## 2023-02-09 NOTE — PROGRESS NOTES
Christal Jenkins is a 57 y.o. female and presents with Follow-up and Medication Refill     Assessment/Plan:    Diagnoses and all orders for this visit:    1. Controlled type 2 diabetes mellitus with complication, without long-term current use of insulin (HCC)  -     HEMOGLOBIN A1C WITH EAG; Future  -     METABOLIC PANEL, COMPREHENSIVE; Future    2. Smoker  -     URINALYSIS W/ RFLX MICROSCOPIC; Future    3. Hyperlipidemia with target LDL less than 100  -     atorvastatin (LIPITOR) 40 mg tablet; Take 1 Tablet by mouth daily. 4. Colon cancer screening  -     REFERRAL TO SURGERY    5. Current mild episode of major depressive disorder, unspecified whether recurrent (HCC)  -     PARoxetine (PAXIL) 10 mg tablet; Take 1 Tablet by mouth daily. 6. Osteoporosis without current pathological fracture, unspecified osteoporosis type  -     alendronate (FOSAMAX) 70 mg tablet; TAKE 1 TABLET BY MOUTH ONCE A WEEK    7. Grief    8. Weight loss  -     CBC WITH AUTOMATED DIFF; Future    9. Personal history of tobacco use, presenting hazards to health  -     COUNSELING VISIT TO DISCUSS NEED FOR LUNG CANCER SCREENING  -     CT LOW DOSE LUNG CANCER SCREENING; Future    10. Screening for deficiency anemia  -     CBC WITH AUTOMATED DIFF; Future    11. Screening for hematuria or proteinuria  -     URINALYSIS W/ RFLX MICROSCOPIC; Future    12. Vitamin D deficiency  -     VITAMIN D, 25 HYDROXY; Future    Other orders  -     CBC WITH AUTOMATED DIFF  -     METABOLIC PANEL, COMPREHENSIVE  -     URINALYSIS W/ RFLX MICROSCOPIC  -     HEMOGLOBIN A1C WITH EAG  -     VITAMIN D, 25 HYDROXY      Follow-up and Dispositions    Return in about 4 weeks (around 3/9/2023) for f/u CT/lab results.        Health Maintenance:   Health Maintenance   Topic Date Due    COVID-19 Vaccine (1) Never done    Shingles Vaccine (1 of 2) Never done    Low dose CT lung screening  Never done    Eye Exam Retinal or Dilated  06/29/2018    Colorectal Cancer Screening Combo 04/25/2019    Cervical cancer screen  05/27/2021    Diabetic Alb to Cr ratio (uACR) test  09/16/2023    Foot Exam Q1  09/16/2023    Lipid Screen  09/16/2023    GFR test (Diabetes, CKD 3-4, OR last GFR 15-59)  02/09/2024    Depression Monitoring  02/09/2024    A1C test (Diabetic or Prediabetic)  02/09/2024    Breast Cancer Screen Mammogram  06/20/2024    DTaP/Tdap/Td series (2 - Td or Tdap) 05/27/2026    Hepatitis C Screening  Completed    Bone Densitometry (Dexa) Screening  Completed    Flu Vaccine  Completed    Pneumococcal 0-64 years  Completed        Subjective:    Labs obtained prior to visit? NO  Reviewed with patient? Not applicable    Colonoscopy 12 years ago; never made appointment with Dr. Amy Paredes for appointment due to location  --will refer to Dr. Belinda Polk for colonoscopy     Need eye exam and previous PCP records for PAP; records release form signed    Recent MRI negative for stroke, mass or hemorrhage     ROS:     Review of Systems   Constitutional:  Positive for weight loss. Negative for chills and fever. Unintentional; lost 4 pounds since last visit 9/2022     Eyes:  Negative for double vision. Respiratory:  Negative for cough, shortness of breath and wheezing. Cardiovascular:  Negative for chest pain, palpitations and leg swelling. Gastrointestinal:  Negative for abdominal pain and blood in stool. Genitourinary: Negative. Negative for frequency, hematuria and urgency. Musculoskeletal: Negative. Skin: Negative. Neurological: Negative. Negative for dizziness. Psychiatric/Behavioral:  Positive for depression. Negative for suicidal ideas. The problem list was updated as a part of today's visit. Patient Active Problem List   Diagnosis Code    Controlled type 2 diabetes mellitus with complication, without long-term current use of insulin ([facility] [address] E11.8    Smoker F17.200    Anxiety and depression F41.9, F32. A    Vitamin D deficiency E55.9    Hyperlipidemia with target LDL less than 100 E78.5    Obesity E66.9    Routine adult health maintenance Z00.00    Hypertensive retinopathy of both eyes - mild H35.033    Osteoporosis without current pathological fracture M81.0    Grief F43.21    Weight loss R63.4    Personal history of tobacco use, presenting hazards to health Z87.891       The PSH, FH were reviewed. SH:  Social History     Tobacco Use    Smoking status: Every Day     Packs/day: 0.50     Years: 40.00     Pack years: 20.00     Types: Cigarettes    Smokeless tobacco: Never   Vaping Use    Vaping Use: Never used   Substance Use Topics    Alcohol use: Not Currently     Comment: \"ONCE IN A WHILE, NOT REALLY\"    Drug use: No       Medications/Allergies:  Current Outpatient Medications on File Prior to Visit   Medication Sig Dispense Refill    ASTAXANTHIN PO Take 8 mg by mouth two (2) times a day. OneTouch Ultra Test strip USE TEST STRIPS TO CHECK GLUCOSE ONCE DAILY      Synjardy 12.5-1,000 mg per tablet TAKE 1 TABLET BY MOUTH TWICE DAILY FOR 90 DAYS 120 Tablet 1    ibuprofen (MOTRIN) 600 mg tablet Take 1 Tab by mouth every six (6) hours as needed for Pain. 20 Tab 0    aspirin 81 mg chewable tablet Take 1 Tab by mouth daily. 90 Tab 2     No current facility-administered medications on file prior to visit. No Known Allergies    Objective:  Visit Vitals  /71   Pulse 76   Temp 98.3 °F (36.8 °C) (Temporal)   Resp 18   Ht 5' 4\" (1.626 m)   Wt 143 lb 12.8 oz (65.2 kg)   SpO2 95%   BMI 24.68 kg/m²    Body mass index is 24.68 kg/m². Physical assessment  Physical Exam  Constitutional:       General: She is not in acute distress. Appearance: Normal appearance. She is not toxic-appearing. Cardiovascular:      Rate and Rhythm: Normal rate and regular rhythm. Pulses: Normal pulses. Heart sounds: Normal heart sounds. Pulmonary:      Effort: Pulmonary effort is normal. No respiratory distress. Breath sounds: Normal breath sounds. No stridor.  No wheezing. Abdominal:      General: Bowel sounds are normal.      Palpations: Abdomen is soft. Neurological:      General: No focal deficit present. Mental Status: She is alert and oriented to person, place, and time. Cranial Nerves: No cranial nerve deficit. Sensory: No sensory deficit. Motor: No weakness. Coordination: Coordination normal.         Labwork and Ancillary Studies:    CBC w/Diff  Lab Results   Component Value Date/Time    WBC 8.6 02/09/2023 01:18 AM    HGB 15.0 02/09/2023 01:18 AM    PLATELET 294 57/54/7680 01:18 AM         Basic Metabolic Profile  Lab Results   Component Value Date/Time    Sodium 141 02/09/2023 01:18 AM    Potassium 4.1 02/09/2023 01:18 AM    Chloride 104 02/09/2023 01:18 AM    CO2 19 (L) 02/09/2023 01:18 AM    Anion gap 15.0 05/31/2017 08:01 AM    Glucose 85 02/09/2023 01:18 AM    BUN 15 02/09/2023 01:18 AM    Creatinine 0.46 (L) 02/09/2023 01:18 AM    BUN/Creatinine ratio 33 (H) 02/09/2023 01:18 AM    GFR est AA >60.0 04/11/2017 01:09 PM    GFR est non-AA >60 04/11/2017 01:09 PM    Calcium 9.3 02/09/2023 01:18 AM        Cholesterol  Lab Results   Component Value Date/Time    Cholesterol, total 147 09/16/2022 11:09 AM    HDL Cholesterol 45 09/16/2022 11:09 AM    LDL, calculated 86 09/16/2022 11:09 AM    LDL, calculated 70 05/31/2017 08:01 AM    Triglyceride 84 09/16/2022 11:09 AM    CHOL/HDL Ratio 3.1 05/27/2016 07:52 AM           I have discussed the diagnosis with the patient and the intended plan as seen in the above orders. The patient has received an After-Visit Summary and questions were answered concerning future plans. An After Visit Summary was printed and given to the patient. All diagnosis have been discussed with the patient and all of the patient's questions have been answered. Follow-up and Dispositions    Return in about 4 weeks (around 3/9/2023) for f/u CT/lab results.            Rishabh Husain NP-C  [facility]   [address] [facility] [address] [id number] with the patient the current USPSTF guidelines released March 9, 2021 for screening for lung cancer. For adults aged 48 to [de-identified] years who have a 20 pack-year smoking history and currently smoke or have quit within the past 15 years the grade B recommendation is to:  Screen for lung cancer with low-dose computed tomography (LDCT) every year. Stop screening once a person has not smoked for 15 years or has a health problem that limits life expectancy or the ability to have lung surgery. The patient  reports that she has been smoking cigarettes. She has a 20.00 pack-year smoking history. She has never used smokeless tobacco..  Discussed with patient the risks and benefits of screening, including over-diagnosis, false positive rate, and total radiation exposure. The patient currently exhibits no signs or symptoms suggestive of lung cancer. Discussed with patient the importance of compliance with yearly annual lung cancer screenings and willingness to undergo diagnosis and treatment if screening scan is positive. In addition, the patient was counseled regarding the importance of remaining smoke free and/or total smoking cessation.     Also reviewed the following if the patient has Medicare that as of February 10, 2022, Medicare only covers LDCT screening in patients aged 51-72 with at least a 20 pack-year smoking history who currently smoke or have quit in the last 15 years

## 2023-02-09 NOTE — PROGRESS NOTES
Patient will bring her covid vaccine card next visit. Patient have not been to the eye doctor. Tahir Tavarez presents today for   Chief Complaint   Patient presents with    Follow-up       Is someone accompanying this pt? no    Is the patient using any DME equipment during OV? no    Depression Screening:  3 most recent PHQ Screens 2/9/2023   PHQ Not Done -   Little interest or pleasure in doing things Not at all   Feeling down, depressed, irritable, or hopeless Several days   Total Score PHQ 2 1       Learning Assessment:  Learning Assessment 11/19/2015   PRIMARY LEARNER Patient   HIGHEST LEVEL OF EDUCATION - PRIMARY LEARNER  SOME COLLEGE   BARRIERS PRIMARY LEARNER NONE   CO-LEARNER CAREGIVER No   PRIMARY LANGUAGE Luxembourgish   LEARNER PREFERENCE PRIMARY VIDEOS   ANSWERED BY self   RELATIONSHIP SELF       Fall Risk  No flowsheet data found. ADL  No flowsheet data found. Health Maintenance reviewed and discussed and ordered per Provider. Health Maintenance Due   Topic Date Due    COVID-19 Vaccine (1) Never done    Shingles Vaccine (1 of 2) Never done    Eye Exam Retinal or Dilated  06/29/2018    Colorectal Cancer Screening Combo  04/25/2019    Cervical cancer screen  05/27/2021   . Coordination of Care:  1. Have you been to the ER, urgent care clinic since your last visit? Hospitalized since your last visit? No     2. Have you seen or consulted any other health care providers outside of the [address] [place] [address] since your last visit? Include any pap smears or colon screening. No       3. For patients aged 39-70: Has the patient had a colonoscopy / FIT/ Cologuard? No      If the patient is female:    4. For patients aged 41-77: Has the patient had a mammogram within the past 2 years? Yes - no Care Gap present      5. For patients aged 21-65: Has the patient had a pap smear?  No

## 2023-02-09 NOTE — PATIENT INSTRUCTIONS

## 2023-02-10 LAB
25(OH)D3+25(OH)D2 SERPL-MCNC: 39.7 NG/ML (ref 30–100)
ALBUMIN SERPL-MCNC: 4.5 G/DL (ref 3.8–4.8)
ALBUMIN/GLOB SERPL: 1.7 {RATIO} (ref 1.2–2.2)
ALP SERPL-CCNC: 111 IU/L (ref 44–121)
ALT SERPL-CCNC: 18 IU/L (ref 0–32)
APPEARANCE UR: CLEAR
AST SERPL-CCNC: 23 IU/L (ref 0–40)
BASOPHILS # BLD AUTO: 0.1 X10E3/UL (ref 0–0.2)
BASOPHILS NFR BLD AUTO: 1 %
BILIRUB SERPL-MCNC: 0.4 MG/DL (ref 0–1.2)
BILIRUB UR QL STRIP: NEGATIVE
BUN SERPL-MCNC: 15 MG/DL (ref 8–27)
BUN/CREAT SERPL: 33 (ref 12–28)
CALCIUM SERPL-MCNC: 9.3 MG/DL (ref 8.7–10.3)
CHLORIDE SERPL-SCNC: 104 MMOL/L (ref 96–106)
CO2 SERPL-SCNC: 19 MMOL/L (ref 20–29)
COLOR UR: YELLOW
CREAT SERPL-MCNC: 0.46 MG/DL (ref 0.57–1)
EGFRCR SERPLBLD CKD-EPI 2021: 107 ML/MIN/1.73
EOSINOPHIL # BLD AUTO: 0.3 X10E3/UL (ref 0–0.4)
EOSINOPHIL NFR BLD AUTO: 3 %
ERYTHROCYTE [DISTWIDTH] IN BLOOD BY AUTOMATED COUNT: 13.1 % (ref 11.7–15.4)
EST. AVERAGE GLUCOSE BLD GHB EST-MCNC: 154 MG/DL
GLOBULIN SER CALC-MCNC: 2.6 G/DL (ref 1.5–4.5)
GLUCOSE SERPL-MCNC: 85 MG/DL (ref 70–99)
GLUCOSE UR QL STRIP: ABNORMAL
HBA1C MFR BLD: 7 % (ref 4.8–5.6)
HCT VFR BLD AUTO: 45.1 % (ref 34–46.6)
HGB BLD-MCNC: 15 G/DL (ref 11.1–15.9)
HGB UR QL STRIP: NEGATIVE
IMM GRANULOCYTES # BLD AUTO: 0 X10E3/UL (ref 0–0.1)
IMM GRANULOCYTES NFR BLD AUTO: 0 %
KETONES UR QL STRIP: NEGATIVE
LEUKOCYTE ESTERASE UR QL STRIP: NEGATIVE
LYMPHOCYTES # BLD AUTO: 3.3 X10E3/UL (ref 0.7–3.1)
LYMPHOCYTES NFR BLD AUTO: 38 %
MCH RBC QN AUTO: 29.8 PG (ref 26.6–33)
MCHC RBC AUTO-ENTMCNC: 33.3 G/DL (ref 31.5–35.7)
MCV RBC AUTO: 90 FL (ref 79–97)
MICRO URNS: ABNORMAL
MONOCYTES # BLD AUTO: 0.7 X10E3/UL (ref 0.1–0.9)
MONOCYTES NFR BLD AUTO: 8 %
NEUTROPHILS # BLD AUTO: 4.3 X10E3/UL (ref 1.4–7)
NEUTROPHILS NFR BLD AUTO: 50 %
NITRITE UR QL STRIP: NEGATIVE
PH UR STRIP: 5.5 [PH] (ref 5–7.5)
PLATELET # BLD AUTO: 274 X10E3/UL (ref 150–450)
POTASSIUM SERPL-SCNC: 4.1 MMOL/L (ref 3.5–5.2)
PROT SERPL-MCNC: 7.1 G/DL (ref 6–8.5)
PROT UR QL STRIP: NEGATIVE
RBC # BLD AUTO: 5.03 X10E6/UL (ref 3.77–5.28)
SODIUM SERPL-SCNC: 141 MMOL/L (ref 134–144)
SP GR UR STRIP: >=1.03 (ref 1–1.03)
UROBILINOGEN UR STRIP-MCNC: 1 MG/DL (ref 0.2–1)
WBC # BLD AUTO: 8.6 X10E3/UL (ref 3.4–10.8)

## 2023-02-17 ENCOUNTER — TELEPHONE (OUTPATIENT)
Dept: FAMILY MEDICINE CLINIC | Age: 63
End: 2023-02-17

## 2023-02-17 NOTE — PROGRESS NOTES
Can you please call patient to let the patient know that her labs are abnormal. Patient's kidney function is slightly low-please drink more water. Urinalysis shows glucose in urine and pt's hemoglobin A1c is 7.0% Goal is to be less than 7% and vit D is normal.  Thanks!

## 2023-02-20 ENCOUNTER — TELEPHONE (OUTPATIENT)
Dept: FAMILY MEDICINE CLINIC | Age: 63
End: 2023-02-20

## 2023-02-20 ENCOUNTER — TELEPHONE (OUTPATIENT)
Facility: CLINIC | Age: 63
End: 2023-02-20

## 2023-02-20 NOTE — TELEPHONE ENCOUNTER
Called patient and notified patient of test results. Patient states has an appointment next month and advised to cancel next month's appointment. Pt has CT low density scheduled later this month and colonoscopy 5.2023.

## 2023-03-03 ENCOUNTER — HOSPITAL ENCOUNTER (OUTPATIENT)
Facility: HOSPITAL | Age: 63
End: 2023-03-03
Payer: COMMERCIAL

## 2023-03-03 VITALS — WEIGHT: 142 LBS | HEIGHT: 64 IN | BODY MASS INDEX: 24.24 KG/M2

## 2023-03-03 DIAGNOSIS — Z87.891 PERSONAL HISTORY OF TOBACCO USE, PRESENTING HAZARDS TO HEALTH: ICD-10-CM

## 2023-03-03 PROCEDURE — 71271 CT THORAX LUNG CANCER SCR C-: CPT

## 2023-03-10 ENCOUNTER — TELEPHONE (OUTPATIENT)
Facility: CLINIC | Age: 63
End: 2023-03-10

## 2023-03-16 ENCOUNTER — TELEPHONE (OUTPATIENT)
Facility: CLINIC | Age: 63
End: 2023-03-16

## 2023-03-16 DIAGNOSIS — Z87.898 HX OF MULTIPLE PULMONARY NODULES: Primary | ICD-10-CM

## 2023-03-16 RX ORDER — ALENDRONATE SODIUM 70 MG/1
TABLET ORAL
COMMUNITY
Start: 2023-02-10

## 2023-03-16 NOTE — TELEPHONE ENCOUNTER
Need a call back to go over her lab results her. She cannot find it in her my chart. Stated she has called 3 times.

## 2023-03-16 NOTE — TELEPHONE ENCOUNTER
Reviewed CT low density screening showing 4 benign pulmonary nodules and radiologist recommends repeat in 1 year. Pt voiced understanding.

## 2023-05-05 ENCOUNTER — OFFICE VISIT (OUTPATIENT)
Facility: CLINIC | Age: 63
End: 2023-05-05
Payer: COMMERCIAL

## 2023-05-05 VITALS
OXYGEN SATURATION: 93 % | HEIGHT: 64 IN | RESPIRATION RATE: 14 BRPM | BODY MASS INDEX: 23.73 KG/M2 | TEMPERATURE: 98.8 F | WEIGHT: 139 LBS | DIASTOLIC BLOOD PRESSURE: 60 MMHG | HEART RATE: 72 BPM | SYSTOLIC BLOOD PRESSURE: 102 MMHG

## 2023-05-05 DIAGNOSIS — F32.0 MAJOR DEPRESSIVE DISORDER, SINGLE EPISODE, MILD (HCC): ICD-10-CM

## 2023-05-05 DIAGNOSIS — E78.5 HYPERLIPIDEMIA, UNSPECIFIED HYPERLIPIDEMIA TYPE: ICD-10-CM

## 2023-05-05 DIAGNOSIS — G44.011 INTRACTABLE EPISODIC CLUSTER HEADACHE: ICD-10-CM

## 2023-05-05 DIAGNOSIS — Z87.898 HX OF MULTIPLE PULMONARY NODULES: ICD-10-CM

## 2023-05-05 DIAGNOSIS — F17.210 CIGARETTE NICOTINE DEPENDENCE WITHOUT COMPLICATION: Primary | ICD-10-CM

## 2023-05-05 DIAGNOSIS — R10.11 RIGHT UPPER QUADRANT ABDOMINAL PAIN: ICD-10-CM

## 2023-05-05 DIAGNOSIS — E11.8 TYPE 2 DIABETES MELLITUS WITH UNSPECIFIED COMPLICATIONS (HCC): ICD-10-CM

## 2023-05-05 PROCEDURE — 3051F HG A1C>EQUAL 7.0%<8.0%: CPT | Performed by: NURSE PRACTITIONER

## 2023-05-05 PROCEDURE — 99212 OFFICE O/P EST SF 10 MIN: CPT | Performed by: NURSE PRACTITIONER

## 2023-05-05 RX ORDER — BUTALBITAL, ACETAMINOPHEN AND CAFFEINE 300; 40; 50 MG/1; MG/1; MG/1
1 CAPSULE ORAL EVERY 6 HOURS PRN
Qty: 56 CAPSULE | Refills: 0 | Status: SHIPPED | OUTPATIENT
Start: 2023-05-05 | End: 2023-05-16

## 2023-05-05 SDOH — ECONOMIC STABILITY: INCOME INSECURITY: HOW HARD IS IT FOR YOU TO PAY FOR THE VERY BASICS LIKE FOOD, HOUSING, MEDICAL CARE, AND HEATING?: NOT HARD AT ALL

## 2023-05-05 SDOH — ECONOMIC STABILITY: HOUSING INSECURITY
IN THE LAST 12 MONTHS, WAS THERE A TIME WHEN YOU DID NOT HAVE A STEADY PLACE TO SLEEP OR SLEPT IN A SHELTER (INCLUDING NOW)?: NO

## 2023-05-05 SDOH — ECONOMIC STABILITY: FOOD INSECURITY: WITHIN THE PAST 12 MONTHS, YOU WORRIED THAT YOUR FOOD WOULD RUN OUT BEFORE YOU GOT MONEY TO BUY MORE.: NEVER TRUE

## 2023-05-05 SDOH — ECONOMIC STABILITY: FOOD INSECURITY: WITHIN THE PAST 12 MONTHS, THE FOOD YOU BOUGHT JUST DIDN'T LAST AND YOU DIDN'T HAVE MONEY TO GET MORE.: NEVER TRUE

## 2023-05-05 ASSESSMENT — ENCOUNTER SYMPTOMS
WHEEZING: 0
CHEST TIGHTNESS: 0
ABDOMINAL PAIN: 1
STRIDOR: 0
DIARRHEA: 0
BACK PAIN: 0
CONSTIPATION: 0
COUGH: 0

## 2023-05-12 ENCOUNTER — HOSPITAL ENCOUNTER (OUTPATIENT)
Facility: HOSPITAL | Age: 63
Discharge: HOME | End: 2023-05-12
Payer: COMMERCIAL

## 2023-05-12 DIAGNOSIS — R10.11 RIGHT UPPER QUADRANT ABDOMINAL PAIN: ICD-10-CM

## 2023-05-12 PROCEDURE — 76705 ECHO EXAM OF ABDOMEN: CPT

## 2023-05-16 DIAGNOSIS — G44.011 INTRACTABLE EPISODIC CLUSTER HEADACHE: Primary | ICD-10-CM

## 2023-05-16 RX ORDER — BUTALBITAL, ACETAMINOPHEN AND CAFFEINE 50; 325; 40 MG/1; MG/1; MG/1
1 TABLET ORAL EVERY 6 HOURS PRN
Qty: 120 TABLET | Refills: 0 | Status: SHIPPED | OUTPATIENT
Start: 2023-05-16 | End: 2023-06-15

## 2023-05-23 ENCOUNTER — NURSE ONLY (OUTPATIENT)
Age: 63
End: 2023-05-23

## 2023-05-23 VITALS
HEART RATE: 69 BPM | TEMPERATURE: 98 F | WEIGHT: 152 LBS | HEIGHT: 64 IN | OXYGEN SATURATION: 97 % | BODY MASS INDEX: 25.95 KG/M2 | RESPIRATION RATE: 18 BRPM

## 2023-05-23 DIAGNOSIS — E11.8 TYPE 2 DIABETES MELLITUS WITH UNSPECIFIED COMPLICATIONS (HCC): Primary | ICD-10-CM

## 2023-05-23 DIAGNOSIS — Z86.010 HISTORY OF COLON POLYPS: ICD-10-CM

## 2023-05-23 DIAGNOSIS — G44.011 INTRACTABLE EPISODIC CLUSTER HEADACHE: ICD-10-CM

## 2023-05-23 DIAGNOSIS — Z12.11 COLON CANCER SCREENING: ICD-10-CM

## 2023-05-23 RX ORDER — BUTALBITAL, ACETAMINOPHEN AND CAFFEINE 50; 325; 40 MG/1; MG/1; MG/1
1 TABLET ORAL 2 TIMES DAILY PRN
Qty: 60 TABLET | Refills: 0 | Status: SHIPPED | OUTPATIENT
Start: 2023-05-23 | End: 2023-06-22
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
## 2023-05-26 DIAGNOSIS — K76.9 LIVER LESION: Primary | ICD-10-CM

## 2023-05-30 ENCOUNTER — TRANSCRIBE ORDERS (OUTPATIENT)
Facility: HOSPITAL | Age: 63
End: 2023-05-30

## 2023-05-30 DIAGNOSIS — Z12.31 SCREENING MAMMOGRAM FOR HIGH-RISK PATIENT: Primary | ICD-10-CM

## 2023-06-20 ENCOUNTER — OFFICE VISIT (OUTPATIENT)
Facility: CLINIC | Age: 63
End: 2023-06-20
Payer: COMMERCIAL

## 2023-06-20 VITALS
TEMPERATURE: 98.2 F | HEART RATE: 70 BPM | HEIGHT: 64 IN | OXYGEN SATURATION: 97 % | BODY MASS INDEX: 25.64 KG/M2 | WEIGHT: 150.2 LBS | RESPIRATION RATE: 18 BRPM | DIASTOLIC BLOOD PRESSURE: 68 MMHG | SYSTOLIC BLOOD PRESSURE: 117 MMHG

## 2023-06-20 DIAGNOSIS — K76.9 LIVER LESION: ICD-10-CM

## 2023-06-20 DIAGNOSIS — E11.8 TYPE 2 DIABETES MELLITUS WITH UNSPECIFIED COMPLICATIONS (HCC): ICD-10-CM

## 2023-06-20 DIAGNOSIS — Z87.898 HX OF MULTIPLE PULMONARY NODULES: ICD-10-CM

## 2023-06-20 DIAGNOSIS — Z87.891 FORMER SMOKER: ICD-10-CM

## 2023-06-20 DIAGNOSIS — Z12.11 COLON CANCER SCREENING: Primary | ICD-10-CM

## 2023-06-20 DIAGNOSIS — J40 BRONCHITIS: ICD-10-CM

## 2023-06-20 LAB — HBA1C MFR BLD: 7.3 %

## 2023-06-20 PROCEDURE — 99213 OFFICE O/P EST LOW 20 MIN: CPT | Performed by: NURSE PRACTITIONER

## 2023-06-20 PROCEDURE — 83036 HEMOGLOBIN GLYCOSYLATED A1C: CPT | Performed by: NURSE PRACTITIONER

## 2023-06-20 PROCEDURE — 3051F HG A1C>EQUAL 7.0%<8.0%: CPT | Performed by: NURSE PRACTITIONER

## 2023-06-20 RX ORDER — BENZONATATE 100 MG/1
100 CAPSULE ORAL 3 TIMES DAILY PRN
Qty: 30 CAPSULE | Refills: 0 | Status: SHIPPED | OUTPATIENT
Start: 2023-06-20 | End: 2023-06-30

## 2023-06-20 RX ORDER — DOXYCYCLINE HYCLATE 100 MG
100 TABLET ORAL 2 TIMES DAILY
Qty: 14 TABLET | Refills: 0 | Status: SHIPPED | OUTPATIENT
Start: 2023-06-20 | End: 2023-06-27

## 2023-06-20 ASSESSMENT — PATIENT HEALTH QUESTIONNAIRE - PHQ9
SUM OF ALL RESPONSES TO PHQ9 QUESTIONS 1 & 2: 0
SUM OF ALL RESPONSES TO PHQ QUESTIONS 1-9: 0
2. FEELING DOWN, DEPRESSED OR HOPELESS: 0
SUM OF ALL RESPONSES TO PHQ QUESTIONS 1-9: 0
1. LITTLE INTEREST OR PLEASURE IN DOING THINGS: 0

## 2023-06-21 ENCOUNTER — TELEPHONE (OUTPATIENT)
Facility: CLINIC | Age: 63
End: 2023-06-21

## 2023-06-21 NOTE — TELEPHONE ENCOUNTER
Mamadou Fitzgerald with [facility] stated that information needs to change on patient's CT for Friday, 6/23.     Mamadou Fitzgerald with [facility]  contact number: [phone]

## 2023-06-22 RX ORDER — ALENDRONATE SODIUM 70 MG/1
TABLET ORAL
COMMUNITY
Start: 2023-06-21

## 2023-06-22 NOTE — TELEPHONE ENCOUNTER
Called back to Coleen regarding authorization. Coleen from [facility] needs an order re-faxed to her as well as the information regarding previous CT covered at [facility] as patient has CT tomorrow.

## 2023-06-23 ENCOUNTER — HOSPITAL ENCOUNTER (OUTPATIENT)
Dept: WOMENS IMAGING | Facility: HOSPITAL | Age: 63
End: 2023-06-23
Payer: COMMERCIAL

## 2023-06-23 DIAGNOSIS — Z12.31 SCREENING MAMMOGRAM FOR HIGH-RISK PATIENT: ICD-10-CM

## 2023-06-23 PROCEDURE — 77063 BREAST TOMOSYNTHESIS BI: CPT

## 2023-06-29 ASSESSMENT — ENCOUNTER SYMPTOMS
CHEST TIGHTNESS: 0
CONSTIPATION: 0
DIARRHEA: 0
WHEEZING: 0
STRIDOR: 0
ABDOMINAL PAIN: 0
COUGH: 0
BACK PAIN: 0

## 2023-08-25 DIAGNOSIS — E11.8 TYPE 2 DIABETES MELLITUS WITH UNSPECIFIED COMPLICATIONS (HCC): Primary | ICD-10-CM

## 2023-08-29 ENCOUNTER — HOSPITAL ENCOUNTER (OUTPATIENT)
Facility: HOSPITAL | Age: 63
Discharge: HOME | End: 2023-09-01
Payer: MEDICARE

## 2023-08-29 DIAGNOSIS — K76.9 LIVER LESION: ICD-10-CM

## 2023-08-29 LAB — CREAT UR-MCNC: 0.5 MG/DL (ref 0.6–1.3)

## 2023-08-29 PROCEDURE — 82565 ASSAY OF CREATININE: CPT

## 2023-08-29 PROCEDURE — 6360000004 HC RX CONTRAST MEDICATION: Performed by: NURSE PRACTITIONER

## 2023-08-29 PROCEDURE — 74170 CT ABD WO CNTRST FLWD CNTRST: CPT

## 2023-08-29 RX ADMIN — IOPAMIDOL 100 ML: 755 INJECTION, SOLUTION INTRAVENOUS at 09:56

## 2023-09-08 ENCOUNTER — TELEPHONE (OUTPATIENT)
Facility: CLINIC | Age: 63
End: 2023-09-08

## 2023-09-08 DIAGNOSIS — D18.03 LIVER HEMANGIOMA: Primary | ICD-10-CM

## 2023-09-08 RX ORDER — ESTRADIOL 0.1 MG/G
CREAM VAGINAL
COMMUNITY
Start: 2023-08-22

## 2023-09-12 RX ORDER — PAROXETINE 10 MG/1
10 TABLET, FILM COATED ORAL DAILY
Qty: 90 TABLET | Refills: 0 | OUTPATIENT
Start: 2023-09-12

## 2023-09-20 RX ORDER — ALENDRONATE SODIUM 70 MG/1
70 TABLET ORAL WEEKLY
Qty: 12 TABLET | Refills: 0 | OUTPATIENT
Start: 2023-09-20

## 2023-09-25 NOTE — TELEPHONE ENCOUNTER
[facility]  Subject: Refill Request     QUESTIONS   Name of Medication? PARoxetine (PAXIL) 10 MG tablet   Patient-reported dosage and instructions? 10 MG   How many days do you have left? 0   Preferred Pharmacy? [facility]   Pharmacy phone number (if available)? [phone]   Additional Information for Provider? Pt has medication but just does not   have anymore refills left. Pt would like a call back. ---------------------------------------------------------------------------   --------------   Emily Asper INFO   What is the best way for the office to contact you? OK to leave message on   voicemail   Preferred Call Back Phone Number? [phone]   ---------------------------------------------------------------------------   --------------   SCRIPT ANSWERS   Relationship to Patient?  Self

## 2023-09-28 RX ORDER — ALENDRONATE SODIUM 70 MG/1
70 TABLET ORAL
Qty: 4 TABLET | Refills: 0 | Status: SHIPPED | OUTPATIENT
Start: 2023-09-28 | End: 2023-10-12 | Stop reason: SDUPTHER

## 2023-09-28 RX ORDER — PAROXETINE 10 MG/1
10 TABLET, FILM COATED ORAL DAILY
Qty: 90 TABLET | Refills: 1 | Status: SHIPPED | OUTPATIENT
Start: 2023-09-28

## 2023-09-28 RX ORDER — PAROXETINE 10 MG/1
10 TABLET, FILM COATED ORAL DAILY
Qty: 30 TABLET | OUTPATIENT
Start: 2023-09-28

## 2023-09-28 RX ORDER — PAROXETINE 10 MG/1
10 TABLET, FILM COATED ORAL DAILY
Qty: 90 TABLET | Refills: 0 | OUTPATIENT
Start: 2023-09-28

## 2023-10-12 ENCOUNTER — OFFICE VISIT (OUTPATIENT)
Facility: CLINIC | Age: 63
End: 2023-10-12
Payer: MEDICARE

## 2023-10-12 VITALS
WEIGHT: 155 LBS | BODY MASS INDEX: 26.46 KG/M2 | HEIGHT: 64 IN | OXYGEN SATURATION: 96 % | HEART RATE: 76 BPM | DIASTOLIC BLOOD PRESSURE: 67 MMHG | RESPIRATION RATE: 18 BRPM | TEMPERATURE: 98.5 F | SYSTOLIC BLOOD PRESSURE: 119 MMHG

## 2023-10-12 DIAGNOSIS — M47.816 ARTHRITIS, LUMBAR SPINE: ICD-10-CM

## 2023-10-12 DIAGNOSIS — D18.03 LIVER HEMANGIOMA: ICD-10-CM

## 2023-10-12 DIAGNOSIS — Z87.891 FORMER SMOKER: ICD-10-CM

## 2023-10-12 DIAGNOSIS — R25.2 LEG CRAMPS: ICD-10-CM

## 2023-10-12 DIAGNOSIS — E11.8 TYPE 2 DIABETES MELLITUS WITH UNSPECIFIED COMPLICATIONS (HCC): ICD-10-CM

## 2023-10-12 DIAGNOSIS — K63.5 POLYP OF COLON, UNSPECIFIED PART OF COLON, UNSPECIFIED TYPE: ICD-10-CM

## 2023-10-12 DIAGNOSIS — M81.0 OSTEOPOROSIS, UNSPECIFIED OSTEOPOROSIS TYPE, UNSPECIFIED PATHOLOGICAL FRACTURE PRESENCE: ICD-10-CM

## 2023-10-12 DIAGNOSIS — E78.49 OTHER HYPERLIPIDEMIA: ICD-10-CM

## 2023-10-12 DIAGNOSIS — Z71.89 ACP (ADVANCE CARE PLANNING): ICD-10-CM

## 2023-10-12 DIAGNOSIS — Z91.81 AT HIGH RISK FOR FALLS: ICD-10-CM

## 2023-10-12 DIAGNOSIS — Z00.00 MEDICARE ANNUAL WELLNESS VISIT, SUBSEQUENT: Primary | ICD-10-CM

## 2023-10-12 PROCEDURE — G8484 FLU IMMUNIZE NO ADMIN: HCPCS | Performed by: NURSE PRACTITIONER

## 2023-10-12 PROCEDURE — 1123F ACP DISCUSS/DSCN MKR DOCD: CPT | Performed by: NURSE PRACTITIONER

## 2023-10-12 PROCEDURE — 3017F COLORECTAL CA SCREEN DOC REV: CPT | Performed by: NURSE PRACTITIONER

## 2023-10-12 PROCEDURE — G0439 PPPS, SUBSEQ VISIT: HCPCS | Performed by: NURSE PRACTITIONER

## 2023-10-12 PROCEDURE — 3051F HG A1C>EQUAL 7.0%<8.0%: CPT | Performed by: NURSE PRACTITIONER

## 2023-10-12 RX ORDER — ALENDRONATE SODIUM 70 MG/1
70 TABLET ORAL
Qty: 4 TABLET | Refills: 0 | Status: SHIPPED | OUTPATIENT
Start: 2023-10-12

## 2023-10-12 ASSESSMENT — PATIENT HEALTH QUESTIONNAIRE - PHQ9
SUM OF ALL RESPONSES TO PHQ QUESTIONS 1-9: 0
SUM OF ALL RESPONSES TO PHQ QUESTIONS 1-9: 0
2. FEELING DOWN, DEPRESSED OR HOPELESS: 0
SUM OF ALL RESPONSES TO PHQ QUESTIONS 1-9: 0
1. LITTLE INTEREST OR PLEASURE IN DOING THINGS: 0
SUM OF ALL RESPONSES TO PHQ9 QUESTIONS 1 & 2: 0
SUM OF ALL RESPONSES TO PHQ QUESTIONS 1-9: 0

## 2023-10-12 ASSESSMENT — LIFESTYLE VARIABLES
HOW MANY STANDARD DRINKS CONTAINING ALCOHOL DO YOU HAVE ON A TYPICAL DAY: PATIENT DOES NOT DRINK
HOW OFTEN DO YOU HAVE A DRINK CONTAINING ALCOHOL: NEVER

## 2023-10-12 NOTE — PROGRESS NOTES
Madeleine January presents today for   Chief Complaint   Patient presents with    Medicare AWV    Weight Management       Is someone accompanying this pt? No     Is the patient using any DME equipment during OV? No     Depression Screening:       No data to display                Learning Assessment:  No flowsheet data found. Fall Risk  No flowsheet data found. ADL       No data to display                Health Maintenance reviewed and discussed and ordered per Provider. Health Maintenance Due   Topic Date Due    COVID-19 Vaccine (1) Never done    Pneumococcal 65+ years Vaccine (1 - PCV) 07/26/1966    Colorectal Cancer Screen  Never done    Shingles vaccine (1 of 2) Never done    Diabetic retinal exam  06/29/2017    Hepatitis B vaccine (1 of 3 - Risk 3-dose series) Never done    Cervical cancer screen  05/27/2021    Flu vaccine (1) 08/01/2023    Annual Wellness Visit (AWV)  Never done    Diabetic foot exam  09/16/2023    Diabetic Alb to Cr ratio (uACR) test  09/16/2023    Lipids  09/16/2023   . Coordination of Care:  1. Have you been to the ER, urgent care clinic since your last visit? Hospitalized since your last visit? No     2. Have you seen or consulted any other health care providers outside of the [address] [place] [address] since your last visit? Include any pap smears or colon screening. No     3. For patients aged 43-73: Has the patient had a colonoscopy / FIT/ Cologuard? Yes       If the patient is female:    4. For patients aged 43-66: Has the patient had a mammogram within the past 2 years? Yes       5. For patients aged 21-65: Has the patient had a pap smear?  Yes

## 2023-10-12 NOTE — PROGRESS NOTES
Medicare Annual Wellness Visit    Ingrid Evangelista is here for Medicare AWV and Weight Management    Assessment & Plan   Medicare annual wellness visit, subsequent  Liver hemangioma  Comments:  stable-  At high risk for falls  Comments:  patient was sitting in chair and the chair broke and fell on bottom  Osteoporosis, unspecified osteoporosis type, unspecified pathological fracture presence  -     alendronate (FOSAMAX) 70 MG tablet; Take 1 tablet by mouth every 7 days, Disp-4 tablet, R-0Normal  Polyp of colon, unspecified part of colon, unspecified type  Comments:  3 polyps-repeat colonoscopy in 3 years   Former smoker  Type 2 diabetes mellitus with unspecified complications ([address] )  -      DIABETES FOOT EXAM  -     Microalbumin / Creatinine Urine Ratio; Future  -     Hemoglobin A1C; Future  Arthritis, lumbar spine  Other hyperlipidemia  -     Lipid Panel; Future  Leg cramps  -     Magnesium; Future  ACP (advance care planning)  [facility] -  Referral to ACP Clinical Specialist  Recommendations for Preventive Services Due: see orders and patient instructions/AVS.  Recommended screening schedule for the next 5-10 years is provided to the patient in written form: see Patient Instructions/AVS.     Return in about 5 days (around 10/17/2023) for fasting labs and then 3 month f/u . Subjective   Patient recently fell out of chair because chair broke      PATIENT REFUSES PAP SMEAR    Patient's complete Health Risk Assessment and screening values have been reviewed and are found in Flowsheets. The following problems were reviewed today and where indicated follow up appointments were made and/or referrals ordered.     Positive Risk Factor Screenings with Interventions:    Fall Risk:  Do you feel unsteady or are you worried about falling? : no  2 or more falls in past year?: no  Fall with injury in past year?: (!) yes     Interventions:    Patient declines any further evaluation or treatment                  Safety:  Do you

## 2023-10-13 ENCOUNTER — CLINICAL DOCUMENTATION (OUTPATIENT)
Dept: SPIRITUAL SERVICES | Age: 63
End: 2023-10-13

## 2023-10-17 ENCOUNTER — HOSPITAL ENCOUNTER (OUTPATIENT)
Facility: HOSPITAL | Age: 63
Setting detail: SPECIMEN
Discharge: HOME | End: 2023-10-20
Payer: MEDICARE

## 2023-10-17 LAB
CHOLEST SERPL-MCNC: 253 MG/DL
CREAT UR-MCNC: 46 MG/DL (ref 30–125)
EST. AVERAGE GLUCOSE BLD GHB EST-MCNC: 174 MG/DL
HBA1C MFR BLD: 7.7 % (ref 4.2–5.6)
HDLC SERPL-MCNC: 43 MG/DL (ref 40–60)
HDLC SERPL: 5.9 (ref 0–5)
LDLC SERPL CALC-MCNC: 172.2 MG/DL (ref 0–100)
LIPID PANEL: ABNORMAL
MAGNESIUM SERPL-MCNC: 2 MG/DL (ref 1.6–2.6)
MICROALBUMIN UR-MCNC: 0.51 MG/DL (ref 0–3)
MICROALBUMIN/CREAT UR-RTO: 11 MG/G (ref 0–30)
TRIGL SERPL-MCNC: 189 MG/DL
VLDLC SERPL CALC-MCNC: 37.8 MG/DL

## 2023-10-17 PROCEDURE — 83036 HEMOGLOBIN GLYCOSYLATED A1C: CPT

## 2023-10-17 PROCEDURE — 80061 LIPID PANEL: CPT

## 2023-10-17 PROCEDURE — 82043 UR ALBUMIN QUANTITATIVE: CPT

## 2023-10-17 PROCEDURE — 82570 ASSAY OF URINE CREATININE: CPT

## 2023-10-17 PROCEDURE — 36415 COLL VENOUS BLD VENIPUNCTURE: CPT

## 2023-10-17 PROCEDURE — 83735 ASSAY OF MAGNESIUM: CPT

## 2023-10-18 RX ORDER — ATORVASTATIN CALCIUM 10 MG/1
10 TABLET, FILM COATED ORAL DAILY
Qty: 90 TABLET | Refills: 1 | Status: SHIPPED | OUTPATIENT
Start: 2023-10-18

## 2023-10-20 ENCOUNTER — CLINICAL DOCUMENTATION (OUTPATIENT)
Dept: SPIRITUAL SERVICES | Age: 63
End: 2023-10-20

## 2023-11-10 ENCOUNTER — CLINICAL DOCUMENTATION (OUTPATIENT)
Dept: CASE MANAGEMENT | Age: 63
End: 2023-11-10

## 2023-11-10 NOTE — ACP (ADVANCE CARE PLANNING)
Advance Care Planning     Advance Care Planning Clinical Specialist  Conversation Note      Date of ACP Conversation: 11/10/2023    Conversation Conducted with: Patient with Decision Making Capacity    ACP Clinical Specialist: Joselin Lindsey LCSW    Healthcare Decision Maker:     Current Designated Healthcare Decision Maker:     Primary Decision Maker: Jairo Jones - Child - [phone]    Care Preferences    Ventilation: \"If you were in your present state of health and suddenly became very ill and were unable to breathe on your own, what would your preference be about the use of a ventilator (breathing machine) if it were available to you? \"      If the patient would desire the use of ventilator (breathing machine), answer \"yes\". If not, \"no\": yes    \"If your health worsens and it becomes clear that your chance of recovery is unlikely, what would your preference be about the use of a ventilator (breathing machine) if it were available to you? \"     Would the patient desire the use of ventilator (breathing machine)?: No      Resuscitation  \"CPR works best to restart the heart when there is a sudden event, like a heart attack, in someone who is otherwise healthy. Unfortunately, CPR does not typically restart the heart for people who have serious health conditions or who are very sick. \"    \"In the event your heart stopped as a result of an underlying serious health condition, would you want attempts to be made to restart your heart (answer \"yes\" for attempt to resuscitate) or would you prefer a natural death (answer \"no\" for do not attempt to resuscitate)? \" yes       [x] Yes   [] No   Educated Patient / Zakia Toro regarding differences between Advance Directives and portable DNR orders.     Length of ACP Conversation in minutes:  40    Conversation Outcomes:  ACP discussion completed and New Advance Directive completed    Follow-up plan:    [] Schedule follow-up conversation to continue planning  []

## 2023-11-13 ENCOUNTER — CLINICAL DOCUMENTATION (OUTPATIENT)
Dept: CASE MANAGEMENT | Age: 63
End: 2023-11-13

## 2023-11-13 NOTE — ACP (ADVANCE CARE PLANNING)
Advance Care Planning   Ambulatory ACP Specialist Patient Outreach    Date:  11/13/2023    ACP Specialist:  Keanu Gustafson LCSW    Outreach call to patient in follow-up to ACP Specialist referral from:Dee Johnston APRN - NP     [x] PCP  [] Provider   [] Ambulatory Care Management [] Other      For:                  [x] Advance Directive Assistance              [] Complete Portable DNR order              [] Complete POST/POLST/MOST              [] Code Status Discussion             [] Discuss Goals of Care             [] Early ACP Decision-Making              [] Other (Specify)     Date Referral Received:10/12/23    Next Step:   [] ACP scheduled conversation  [] Outreach again in one week               [] Email / Mail [address]  [] Email / Mail Advance Directive   [x] Closing referral.  Routing closure to referring provider/staff and to ACP Specialist . [] Closure letter mailed to patient with invitation to contact ACP Specialist if / when ready. [] Other (Specify here):         [x] At this time, Healthcare Decision Maker Is:        [x] Primary agent named in scanned advance directive. [] Legal Next of Kin. [] Unable to determine legal decision maker at this time. Outreaches:    [x]  Additional Outreach -  Date:  11/13/2023   (Specify Dates & special circumstances): Chart Review    Outcomes: Patient has returned signed [address]  document and it has been uploaded into her medical record. This referral will now be closed. Thank you for this referral.     Hellen Orlando.  LYNN Chang  Advance Care   [address]  [phone]

## 2023-11-25 DIAGNOSIS — M81.0 OSTEOPOROSIS, UNSPECIFIED OSTEOPOROSIS TYPE, UNSPECIFIED PATHOLOGICAL FRACTURE PRESENCE: ICD-10-CM

## 2023-11-27 DIAGNOSIS — M81.0 OSTEOPOROSIS, UNSPECIFIED OSTEOPOROSIS TYPE, UNSPECIFIED PATHOLOGICAL FRACTURE PRESENCE: ICD-10-CM

## 2023-11-27 RX ORDER — ALENDRONATE SODIUM 70 MG/1
70 TABLET ORAL WEEKLY
Qty: 4 TABLET | Refills: 0 | OUTPATIENT
Start: 2023-11-27

## 2023-11-27 RX ORDER — ALENDRONATE SODIUM 70 MG/1
70 TABLET ORAL
Qty: 12 TABLET | Refills: 1 | Status: SHIPPED | OUTPATIENT
Start: 2023-11-27 | End: 2024-01-02 | Stop reason: SDUPTHER

## 2023-12-01 ENCOUNTER — COMMUNITY OUTREACH (OUTPATIENT)
Facility: CLINIC | Age: 63
End: 2023-12-01

## 2024-01-02 ENCOUNTER — HOSPITAL ENCOUNTER (OUTPATIENT)
Facility: HOSPITAL | Age: 64
Setting detail: SPECIMEN
Discharge: HOME | End: 2024-01-05
Payer: MEDICARE

## 2024-01-02 ENCOUNTER — OFFICE VISIT (OUTPATIENT)
Facility: CLINIC | Age: 64
End: 2024-01-02
Payer: MEDICARE

## 2024-01-02 VITALS
TEMPERATURE: 98.2 F | OXYGEN SATURATION: 96 % | BODY MASS INDEX: 26.05 KG/M2 | WEIGHT: 152.6 LBS | DIASTOLIC BLOOD PRESSURE: 67 MMHG | HEIGHT: 64 IN | RESPIRATION RATE: 18 BRPM | SYSTOLIC BLOOD PRESSURE: 124 MMHG | HEART RATE: 79 BPM

## 2024-01-02 DIAGNOSIS — D18.03 LIVER HEMANGIOMA: Primary | ICD-10-CM

## 2024-01-02 DIAGNOSIS — Z87.898 HX OF MULTIPLE PULMONARY NODULES: ICD-10-CM

## 2024-01-02 DIAGNOSIS — M81.0 OSTEOPOROSIS, UNSPECIFIED OSTEOPOROSIS TYPE, UNSPECIFIED PATHOLOGICAL FRACTURE PRESENCE: ICD-10-CM

## 2024-01-02 DIAGNOSIS — Z87.891 FORMER SMOKER: ICD-10-CM

## 2024-01-02 DIAGNOSIS — M47.816 ARTHRITIS, LUMBAR SPINE: ICD-10-CM

## 2024-01-02 DIAGNOSIS — E11.8 TYPE 2 DIABETES MELLITUS WITH UNSPECIFIED COMPLICATIONS (HCC): ICD-10-CM

## 2024-01-02 DIAGNOSIS — B35.1 TOENAIL FUNGUS: ICD-10-CM

## 2024-01-02 DIAGNOSIS — M81.0 AGE-RELATED OSTEOPOROSIS WITHOUT CURRENT PATHOLOGICAL FRACTURE: ICD-10-CM

## 2024-01-02 DIAGNOSIS — E78.49 OTHER HYPERLIPIDEMIA: ICD-10-CM

## 2024-01-02 DIAGNOSIS — F32.0 MAJOR DEPRESSIVE DISORDER, SINGLE EPISODE, MILD (HCC): ICD-10-CM

## 2024-01-02 PROBLEM — R91.1 LUNG NODULE: Status: ACTIVE | Noted: 2024-01-02

## 2024-01-02 LAB
ALBUMIN SERPL-MCNC: 3.9 G/DL (ref 3.4–5)
ALBUMIN/GLOB SERPL: 1.3 (ref 0.8–1.7)
ALP SERPL-CCNC: 97 U/L (ref 45–117)
ALT SERPL-CCNC: 28 U/L (ref 13–56)
ANION GAP SERPL CALC-SCNC: 7 MMOL/L (ref 3–18)
AST SERPL-CCNC: 16 U/L (ref 10–38)
BILIRUB SERPL-MCNC: 0.7 MG/DL (ref 0.2–1)
BUN SERPL-MCNC: 14 MG/DL (ref 7–18)
BUN/CREAT SERPL: 30 (ref 12–20)
CALCIUM SERPL-MCNC: 9.3 MG/DL (ref 8.5–10.1)
CHLORIDE SERPL-SCNC: 109 MMOL/L (ref 100–111)
CO2 SERPL-SCNC: 23 MMOL/L (ref 21–32)
CREAT SERPL-MCNC: 0.46 MG/DL (ref 0.6–1.3)
EST. AVERAGE GLUCOSE BLD GHB EST-MCNC: 169 MG/DL
GLOBULIN SER CALC-MCNC: 2.9 G/DL (ref 2–4)
GLUCOSE SERPL-MCNC: 108 MG/DL (ref 74–99)
HBA1C MFR BLD: 7.5 % (ref 4.2–5.6)
POTASSIUM SERPL-SCNC: 3.9 MMOL/L (ref 3.5–5.5)
PROT SERPL-MCNC: 6.8 G/DL (ref 6.4–8.2)
SODIUM SERPL-SCNC: 139 MMOL/L (ref 136–145)

## 2024-01-02 PROCEDURE — 2022F DILAT RTA XM EVC RTNOPTHY: CPT | Performed by: NURSE PRACTITIONER

## 2024-01-02 PROCEDURE — 80053 COMPREHEN METABOLIC PANEL: CPT

## 2024-01-02 PROCEDURE — G8484 FLU IMMUNIZE NO ADMIN: HCPCS | Performed by: NURSE PRACTITIONER

## 2024-01-02 PROCEDURE — G8428 CUR MEDS NOT DOCUMENT: HCPCS | Performed by: NURSE PRACTITIONER

## 2024-01-02 PROCEDURE — 99213 OFFICE O/P EST LOW 20 MIN: CPT | Performed by: NURSE PRACTITIONER

## 2024-01-02 PROCEDURE — 83036 HEMOGLOBIN GLYCOSYLATED A1C: CPT

## 2024-01-02 PROCEDURE — G8399 PT W/DXA RESULTS DOCUMENT: HCPCS | Performed by: NURSE PRACTITIONER

## 2024-01-02 PROCEDURE — 4004F PT TOBACCO SCREEN RCVD TLK: CPT | Performed by: NURSE PRACTITIONER

## 2024-01-02 PROCEDURE — 36415 COLL VENOUS BLD VENIPUNCTURE: CPT

## 2024-01-02 PROCEDURE — 3017F COLORECTAL CA SCREEN DOC REV: CPT | Performed by: NURSE PRACTITIONER

## 2024-01-02 PROCEDURE — 3051F HG A1C>EQUAL 7.0%<8.0%: CPT | Performed by: NURSE PRACTITIONER

## 2024-01-02 PROCEDURE — 1123F ACP DISCUSS/DSCN MKR DOCD: CPT | Performed by: NURSE PRACTITIONER

## 2024-01-02 PROCEDURE — G8419 CALC BMI OUT NRM PARAM NOF/U: HCPCS | Performed by: NURSE PRACTITIONER

## 2024-01-02 PROCEDURE — 1090F PRES/ABSN URINE INCON ASSESS: CPT | Performed by: NURSE PRACTITIONER

## 2024-01-02 RX ORDER — ATORVASTATIN CALCIUM 10 MG/1
10 TABLET, FILM COATED ORAL DAILY
Qty: 90 TABLET | Refills: 1 | Status: SHIPPED | OUTPATIENT
Start: 2024-01-02

## 2024-01-02 RX ORDER — LIDOCAINE 50 MG/G
1 PATCH TOPICAL DAILY
Qty: 30 PATCH | Refills: 2 | Status: SHIPPED | OUTPATIENT
Start: 2024-01-02 | End: 2024-04-01

## 2024-01-02 RX ORDER — ALENDRONATE SODIUM 70 MG/1
70 TABLET ORAL
Qty: 12 TABLET | Refills: 1 | Status: SHIPPED | OUTPATIENT
Start: 2024-01-02

## 2024-01-02 ASSESSMENT — PATIENT HEALTH QUESTIONNAIRE - PHQ9
2. FEELING DOWN, DEPRESSED OR HOPELESS: 0
SUM OF ALL RESPONSES TO PHQ QUESTIONS 1-9: 0
SUM OF ALL RESPONSES TO PHQ9 QUESTIONS 1 & 2: 0
1. LITTLE INTEREST OR PLEASURE IN DOING THINGS: 0

## 2024-01-02 NOTE — PROGRESS NOTES
Patient states she been to the eye doctor last year.     Doretha SONI Giacomo presents today for   Chief Complaint   Patient presents with    Follow-up       Is someone accompanying this pt? no    Is the patient using any DME equipment during OV? no    Depression Screening:       No data to display                Learning Assessment:      Fall Risk      ADL       No data to display                Health Maintenance reviewed and discussed and ordered per Provider.    Health Maintenance Due   Topic Date Due    COVID-19 Vaccine (1) Never done    Shingles vaccine (1 of 2) Never done    Diabetic retinal exam  06/29/2017    Respiratory Syncytial Virus (RSV) Pregnant or age 60 yrs+ (1 - 1-dose 60+ series) Never done    Cervical cancer screen  05/27/2021   .      Coordination of Care:  1. Have you been to the ER, urgent care clinic since your last visit? Hospitalized since your last visit? no    2. Have you seen or consulted any other health care providers outside of the [facility] since your last visit? Include any pap smears or colon screening. No    3. For patients aged 45-75: Has the patient had a colonoscopy / FIT/ Cologuard?  Yes       If the patient is female:    4. For patients aged 40-74: Has the patient had a mammogram within the past 2 years? Yes       5. For patients aged 21-65: Has the patient had a pap smear? no  
not in acute distress.     Appearance: Normal appearance. She is not toxic-appearing.   Cardiovascular:      Rate and Rhythm: Normal rate.      Pulses: Normal pulses.      Heart sounds: Normal heart sounds.   Pulmonary:      Effort: No respiratory distress.      Breath sounds: No stridor. No wheezing, rhonchi or rales.   Abdominal:      General: Bowel sounds are normal. There is no distension.      Palpations: Abdomen is soft.      Tenderness: There is no abdominal tenderness. There is no guarding.   Musculoskeletal:      Lumbar back: Tenderness present.   Neurological:      General: No focal deficit present.      Mental Status: She is alert and oriented to person, place, and time.   Psychiatric:         Mood and Affect: Mood normal.                 I have discussed the diagnosis with the patient and the intended plan as seen in the above orders.  The patient has received an After-Visit Summary and questions were answered concerning future plans.     An After Visit Summary was printed and given to the patient.    All diagnosis have been discussed with the patient and all of the patient's questions have been answered.           Ayaka OVALLE  [address] [address]

## 2024-01-08 ASSESSMENT — ENCOUNTER SYMPTOMS
CHEST TIGHTNESS: 0
ROS SKIN COMMENTS: TOENAIL FUNGUS
CONSTIPATION: 0
ABDOMINAL PAIN: 0
DIARRHEA: 0
BACK PAIN: 1
COUGH: 0
STRIDOR: 0
WHEEZING: 0

## 2024-01-19 ENCOUNTER — HOSPITAL ENCOUNTER (OUTPATIENT)
Facility: HOSPITAL | Age: 64
Setting detail: RECURRING SERIES
Discharge: HOME | End: 2024-01-22
Payer: MEDICARE

## 2024-01-19 PROCEDURE — 97110 THERAPEUTIC EXERCISES: CPT

## 2024-01-19 PROCEDURE — 97161 PT EVAL LOW COMPLEX 20 MIN: CPT

## 2024-01-19 NOTE — PROGRESS NOTES
[facility]  [address] [place] [address] Phone: [phone] Fax   Plan of Care / Statement of Necessity for Physical Therapy Services     Patient Name: Doretha Gooden : 1960   Medical   Diagnosis: Other low back pain [M54.59] Treatment Diagnosis: M54.59  OTHER LOWER BACK PAIN     Onset Date: 24 (referral) Payor Payor: [facility] MEDICARE / Plan: [employer] DUAL COMPLETE / Product Type: *No Product type* /    Referral Source: Ayaka Johnston APRN * Start of Care (SOC): 2024   Prior Hospitalization: See medical history Provider #: [id number]   Prior Level of Function: Functionally independent, part time  at [employer]   Comorbidities: osteoporosis, DM II, 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 female who presents with c/o low back pain. The patient reports a chronic history of intermittent back pain, right > left, that has affected her function for the past 30 years since the birth of her child. She reports intermittent right sided low back pain, achy in nature, that is exacerbated with bending forward and sit<>stand transfers. She also notes intermittent right upper quadrant pain of unknown origin. Signs/symptoms at eval consistent with mechanical low back pain; right upper quadrant pain could be visceral in origin, pt undergoing additional imaging of upper quarter. Functional deficits include: impaired trunk AROM, impaired hip strength, impaired activity tolerance, low back pain limiting ADLs, impaired balance.  Rehab potential is good due to desire to attain PLOF. Pt would benefit from skilled PT to address above deficits to improve Pt's function and ability to return to PLOF with decreased pain and improved functional mobility.      Evaluation Complexity:  History:  HIGH Complexity :3+ comorbidities / personal factors will impact the outcome/ POC

## 2024-01-19 NOTE — PROGRESS NOTES
PT DAILY TREATMENT NOTE/LUMBAR EVAL       Patient Name: Doretha Gooden    Date: 2024    : 1960  Insurance: Payor: [facility] MEDICARE / Plan: UNITEDHEALTHCARE DUAL COMPLETE / Product Type: *No Product type* /      Patient  verified yes     Visit #   Current / Total 1 10   Time   In / Out 10:22 11:02   Pain   In / Out 0 0   Subjective Functional Status/Changes: See POC   Changes to:  Meds, Allergies, Med Hx, Sx Hx?  If yes, update Summary List yes, see POC     Treatment Area: Other low back pain [M54.59]    SUBJECTIVE    CC: low back pain  History/Mechanism of Injury: chronic history of lower back pain since pregnancy three decades ago  Current Symptoms/Complaints:   Intermittent low back pain in the past  Primarily right sided low back pain- dull pain  Wraps around side    Pain-  Current: 0/10     Worst: 8/10   Best: 0/10  Aggravated By: transition from sitting to standing, bending to clean tables  Alleviated By: sitting down  Previous Treatment/Compliance: chiropractic care  PMHx/Surgical Hx: osteoporosis, DM II, 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, former smoker  Work Hx: part time / at [employer]  Living Situation: lives in two story apartment  PLOF: functionally independent,   Limitations to PLOF: intermittent difficulty with transfers  Pt Goals: feel less pain     OBJECTIVE/EXAMINATION    25 min [x]Eval  - untimed                 Therapeutic Procedures:  Tx Min Billable or 1:1 Min (if diff from Tx Min) Procedure, Rationale, Specifics   10 10 70665 Therapeutic Exercise (timed):  increase ROM, strength, coordination, balance, and proprioception to improve patient's ability to progress to PLOF and address remaining functional goals. (see flow sheet as applicable)     Details if applicable:     5 8 [id number] Therapeutic Activity (timed):  use of dynamic activities replicating functional movements to increase ROM, strength, coordination, balance, and proprioception in order to improve

## 2024-02-02 ENCOUNTER — HOSPITAL ENCOUNTER (OUTPATIENT)
Facility: HOSPITAL | Age: 64
Setting detail: RECURRING SERIES
Discharge: HOME | End: 2024-02-05
Payer: MEDICARE

## 2024-02-06 ENCOUNTER — HOSPITAL ENCOUNTER (OUTPATIENT)
Facility: HOSPITAL | Age: 64
Setting detail: RECURRING SERIES
Discharge: HOME | End: 2024-02-09
Payer: MEDICARE

## 2024-02-06 PROCEDURE — 97112 NEUROMUSCULAR REEDUCATION: CPT

## 2024-02-06 PROCEDURE — 97110 THERAPEUTIC EXERCISES: CPT

## 2024-02-06 PROCEDURE — G0283 ELEC STIM OTHER THAN WOUND: HCPCS

## 2024-02-06 NOTE — PROGRESS NOTES
PHYSICAL / OCCUPATIONAL THERAPY - DAILY TREATMENT NOTE (updated )    Patient Name: Doretha Gooden    Date: 2024    : 1960  Insurance: Payor: [facility] MEDICARE / Plan: UNITEDHEALTHCARE DUAL COMPLETE / Product Type: *No Product type* /      Patient  verified yes     Visit #   Current / Total 2 10 Total Time   Time   In / Out 11:40 12:40 60   Pain   In / Out 7 0    Subjective Functional Status/Changes: I am feeling much better than I was last week when I was ill.       TREATMENT AREA =  Other low back pain [M54.59]    OBJECTIVE    Modalities Rationale:     decrease pain to improve patient's ability to progress to PLOF and address remaining functional goals.             --  10 min [x] Estim Unattended,   Type:  Location:  Position:   IFC  Low back  prone                                     []  w/ice    [x]  w/heat   Skin assessment post-treatment (if applicable):    [x]  intact    []  redness- no adverse reaction                 []redness - adverse reaction:           Therapeutic Procedures:  50  Total 41  Total [facility] Totals Reminder: bill using total billable min of TIMED therapeutic procedures (example: do not include dry needle or estim unattended, both untimed codes, in totals to left)  8-22 min = 1 unit; 23-37 min = 2 units; 38-52 min = 3 units; 53-67 min = 4 units; 68-82 min = 5 units   Tx Min Billable or 1:1 Min (if diff from Tx Min) Procedure, Rationale, Specifics   25 00 [id number] Therapeutic Exercise (timed):  increase ROM, strength, coordination, balance, and proprioception to improve patient's ability to progress to PLOF and address remaining functional goals. (see flow sheet as applicable)   Details if applicable:        06073 Neuromuscular Re-Education (timed):  improve balance, coordination, kinesthetic sense, posture, core stability and proprioception to improve patient's ability to develop conscious control of individual muscles and awareness of position of extremities in order to

## 2024-02-09 ENCOUNTER — HOSPITAL ENCOUNTER (OUTPATIENT)
Facility: HOSPITAL | Age: 64
Setting detail: RECURRING SERIES
Discharge: HOME | End: 2024-02-12
Payer: MEDICARE

## 2024-02-09 PROCEDURE — 97112 NEUROMUSCULAR REEDUCATION: CPT

## 2024-02-09 PROCEDURE — G0283 ELEC STIM OTHER THAN WOUND: HCPCS

## 2024-02-09 PROCEDURE — 97110 THERAPEUTIC EXERCISES: CPT

## 2024-02-09 NOTE — PROGRESS NOTES
PHYSICAL / OCCUPATIONAL THERAPY - DAILY TREATMENT NOTE (updated )    Patient Name: Doretha Gooden    Date: 2024    : 1960  Insurance: Payor: [facility] MEDICARE / Plan: [employer] DUAL COMPLETE / Product Type: *No Product type* /      Patient  verified yes     Visit #   Current / Total 3 10 Total Time   Time   In / Out 11:42 12:32 50   Pain   In / Out 4-5 3-4    Subjective Functional Status/Changes: I felt pretty good after my first follow up session.     TREATMENT AREA =  Other low back pain [M54.59]    OBJECTIVE    Modalities Rationale:     decrease pain to improve patient's ability to progress to PLOF and address remaining functional goals.             --  10 min [x] Estim Unattended,   Type:  Location:  Position:    IFC  Low back  prone                                                []  w/ice    [x]  w/heat   Skin assessment post-treatment (if applicable):    [x]  intact    []  redness- no adverse reaction                 []redness - adverse reaction:         Therapeutic Procedures:  40    [facility] Totals Reminder: bill using total billable min of TIMED therapeutic procedures (example: do not include dry needle or estim unattended, both untimed codes, in totals to left)  8-22 min = 1 unit; 23-37 min = 2 units; 38-52 min = 3 units; 53-67 min = 4 units; 68-82 min = 5 units   Tx Min Billable or 1:1 Min (if diff from Tx Min) Procedure, Rationale, Specifics   16  20206 Therapeutic Exercise (timed):  increase ROM, strength, coordination, balance, and proprioception to improve patient's ability to progress to PLOF and address remaining functional goals. (see flow sheet as applicable)   Details if applicable:       24  [id number] Neuromuscular Re-Education (timed):  improve balance, coordination, kinesthetic sense, posture, core stability and proprioception to improve patient's ability to develop conscious control of individual muscles and awareness of position of extremities in order to progress to PLOF and

## 2024-02-12 RX ORDER — EMPAGLIFLOZIN AND METFORMIN HYDROCHLORIDE 12.5; 1 MG/1; MG/1
1 TABLET ORAL 2 TIMES DAILY
Qty: 180 TABLET | Refills: 0 | Status: SHIPPED | OUTPATIENT
Start: 2024-02-12

## 2024-02-13 ENCOUNTER — HOSPITAL ENCOUNTER (OUTPATIENT)
Facility: HOSPITAL | Age: 64
Setting detail: RECURRING SERIES
Discharge: HOME | End: 2024-02-16
Payer: MEDICARE

## 2024-02-13 PROCEDURE — 97112 NEUROMUSCULAR REEDUCATION: CPT

## 2024-02-13 PROCEDURE — 97110 THERAPEUTIC EXERCISES: CPT

## 2024-02-13 NOTE — PROGRESS NOTES
PHYSICAL / OCCUPATIONAL THERAPY - DAILY TREATMENT NOTE (updated )    Patient Name: Doretha Gooden    Date: 2024    : 1960  Insurance: Payor: [facility] MEDICARE / Plan: [employer] DUAL COMPLETE / Product Type: *No Product type* /      Patient  verified yes     Visit #   Current / Total 4 10 Total Time   Time   In / Out 11:40 12:31 51   Pain   In / Out 0 0    Subjective Functional Status/Changes: I feel a bit under the weather again,        TREATMENT AREA =  Other low back pain [M54.59]    OBJECTIVE    Therapeutic Procedures:  51  Total 41  Total [facility] Totals Reminder: bill using total billable min of TIMED therapeutic procedures (example: do not include dry needle or estim unattended, both untimed codes, in totals to left)  8-22 min = 1 unit; 23-37 min = 2 units; 38-52 min = 3 units; 53-67 min = 4 units; 68-82 min = 5 units   Tx Min Billable or 1:1 Min (if diff from Tx Min) Procedure, Rationale, Specifics   21 11 74414 Therapeutic Exercise (timed):  increase ROM, strength, coordination, balance, and proprioception to improve patient's ability to progress to PLOF and address remaining functional goals. (see flow sheet as applicable)   Details if applicable:       30 30 14978 Neuromuscular Re-Education (timed):  improve balance, coordination, kinesthetic sense, posture, core stability and proprioception to improve patient's ability to develop conscious control of individual muscles and awareness of position of extremities in order to progress to PLOF and address remaining functional goals. (see flow sheet as applicable)     Details if applicable:             [x]  Patient Education billed concurrently with other procedures   [x] Review HEP    [] Progressed/Changed HEP, detail:    [] Other detail:       Objective Information/Functional Measures/Assessment    -Held supine interventions secondary to inc post nasal drainage limiting breathing in supine position.  -Introduced standing balance

## 2024-02-16 ENCOUNTER — HOSPITAL ENCOUNTER (OUTPATIENT)
Facility: HOSPITAL | Age: 64
Setting detail: RECURRING SERIES
Discharge: HOME | End: 2024-02-19
Payer: MEDICARE

## 2024-02-16 PROCEDURE — 97530 THERAPEUTIC ACTIVITIES: CPT

## 2024-02-16 PROCEDURE — 97110 THERAPEUTIC EXERCISES: CPT

## 2024-02-16 NOTE — PROGRESS NOTES
PHYSICAL / OCCUPATIONAL THERAPY - DAILY TREATMENT NOTE (updated )    Patient Name: Doretha Gooden    Date: 2024    : 1960  Insurance: Payor: [facility] MEDICARE / Plan: [employer] DUAL COMPLETE / Product Type: *No Product type* /      Patient  verified yes     Visit #   Current / Total 5 10 Total Time   Time   In / Out 8:22 9:30 68   Pain   In / Out 7 0    Subjective Functional Status/Changes: I had a bit of pain yesterday and today in my right lower back.       TREATMENT AREA =  Other low back pain [M54.59]    OBJECTIVE    Modalities Rationale:     decrease pain and improve tissue extensibility to improve patient's ability to progress to PLOF and address remaining functional goals.             -  10 min   []  Ice     [x]  Heat    location/position: prone, low back    Skin assessment post-treatment (if applicable):    [x]  intact    []  redness- no adverse reaction                 []redness - adverse reaction:           Therapeutic Procedures:  58  Total 27  Total [facility] Totals Reminder: bill using total billable min of TIMED therapeutic procedures (example: do not include dry needle or estim unattended, both untimed codes, in totals to left)  8-22 min = 1 unit; 23-37 min = 2 units; 38-52 min = 3 units; 53-67 min = 4 units; 68-82 min = 5 units   Tx Min Billable or 1:1 Min (if diff from Tx Min) Procedure, Rationale, Specifics   18 10 75269 Therapeutic Exercise (timed):  increase ROM, strength, coordination, balance, and proprioception to improve patient's ability to progress to PLOF and address remaining functional goals. (see flow sheet as applicable)   Details if applicable:        38 02831 Therapeutic Activity (timed):  use of dynamic activities replicating functional movements to increase ROM, strength, coordination, balance, and proprioception in order to improve patient's ability to progress to PLOF and address remaining functional goals.  (see flow sheet as applicable)   Details if 
100     Rotation 75 75                                                -  Strength:    Right (/5) Left (/5)   Hip     Flexion 5 5             Abduction 5 4+             Adduction                 Extension 4 4             ER 4 4+             IR 5 4+   Knee   Extension 5 5              Flexion 5 5   Ankle   Dorsiflexion 5 5      Directional Preference Testing: increased pain at end range of most motions     Functional Squat: 30\" STS = 13 repetitions     Stair Negotiation: reciprocal     Balance:   SLS right 9\", left 6\"    Goals:  Short Term Goals: To be accomplished in 4 weeks  - Goal: Pt to be compliant with initial HEP to improve ability to independently address symptoms and functional deficits.  Status at last note/certification: Established and reviewed with Pt  Current: met, pt reports compliance (2/6/24)  - Goal: Pt will demo B SLS for at least 10 seconds without LOB to improve safety with obstacle negotiation and curb ascent/descent.  Status at last note/certification: SLS right 3 seconds, left 2 seconds  Current: progressing, right 9 seconds, left 6 seconds (2/16/24)  - Goal: Pt to report no more than 4/10 pain at worst in low back to improve ease with household chores.  Status at last note/certification: 8/10 pain at worst  Current: 7/10 pain at worst (2/16/24)  - Goal: Pt will demo bilateral hip ER MMT of 5/5 without increased pain to improve ease of ADLs..  Status at last note/certification: 4/5 B  Current: right hip ER 4/5, left 4+/5 (2/16/24)    Long Term Goals: To be accomplished in 8-12 weeks from initial evaluation  - Goal: Pt will demo B SLS for at least 30 seconds without LOB to improve safety with obstacle negotiation and curb ascent/descent.  Status at last note/certification: SLS right 3 seconds, left 2 seconds  - Goal: Pt will demo bilateral hip extension MMT of 5/5 without increased pain to improve ease of ADLs..  Status at last note/certification: 4-/5 B  - Goal: Pt will perform at least 12

## 2024-02-20 ENCOUNTER — HOSPITAL ENCOUNTER (OUTPATIENT)
Facility: HOSPITAL | Age: 64
Setting detail: RECURRING SERIES
Discharge: HOME | End: 2024-02-23
Payer: MEDICARE

## 2024-02-20 PROCEDURE — 97110 THERAPEUTIC EXERCISES: CPT

## 2024-02-20 PROCEDURE — 97112 NEUROMUSCULAR REEDUCATION: CPT

## 2024-02-20 PROCEDURE — 97530 THERAPEUTIC ACTIVITIES: CPT

## 2024-02-20 NOTE — PROGRESS NOTES
PHYSICAL / OCCUPATIONAL THERAPY - DAILY TREATMENT NOTE (updated )    Patient Name: Doretha Gooden    Date: 2024    : 1960  Insurance: Payor: [facility] MEDICARE / Plan: [employer] DUAL COMPLETE / Product Type: *No Product type* /      Patient  verified yes     Visit #   Current / Total 1 10 Total Time   Time   In / Out 11:40 12:39 59   Pain   In / Out 0 0    Subjective Functional Status/Changes: I felt pretty good over the weekend       TREATMENT AREA =  Other low back pain [M54.59]    OBJECTIVE    Modalities Rationale:     decrease pain and improve tissue extensibility to improve patient's ability to progress to PLOF and address remaining functional goals.             -  10 min    []  Ice     [x]  Heat    location/position: prone, low back    Skin assessment post-treatment (if applicable):    [x]  intact    []  redness- no adverse reaction                 []redness - adverse reaction:         Therapeutic Procedures:  49  Total 41  Total  [facility] Totals Reminder: bill using total billable min of TIMED therapeutic procedures (example: do not include dry needle or estim unattended, both untimed codes, in totals to left)  8-22 min = 1 unit; 23-37 min = 2 units; 38-52 min = 3 units; 53-67 min = 4 units; 68-82 min = 5 units   Tx Min Billable or 1:1 Min (if diff from Tx Min) Procedure, Rationale, Specifics   25 17 [id number] Therapeutic Exercise (timed):  increase ROM, strength, coordination, balance, and proprioception to improve patient's ability to progress to PLOF and address remaining functional goals. (see flow sheet as applicable)   Details if applicable:       97530 Therapeutic Activity (timed):  use of dynamic activities replicating functional movements to increase ROM, strength, coordination, balance, and proprioception in order to improve patient's ability to progress to PLOF and address remaining functional goals.  (see flow sheet as applicable)   Details if applicable:        [id number]

## 2024-02-23 ENCOUNTER — HOSPITAL ENCOUNTER (OUTPATIENT)
Facility: HOSPITAL | Age: 64
Setting detail: RECURRING SERIES
Discharge: HOME | End: 2024-02-26
Payer: MEDICARE

## 2024-02-23 PROCEDURE — 97110 THERAPEUTIC EXERCISES: CPT

## 2024-02-23 PROCEDURE — 97530 THERAPEUTIC ACTIVITIES: CPT

## 2024-02-23 PROCEDURE — 97112 NEUROMUSCULAR REEDUCATION: CPT

## 2024-02-23 NOTE — PROGRESS NOTES
PHYSICAL / OCCUPATIONAL THERAPY - DAILY TREATMENT NOTE (updated )    Patient Name: Doretha Gooden    Date: 2024    : 1960  Insurance: Payor: [facility] MEDICARE / Plan: [employer] DUAL COMPLETE / Product Type: *No Product type* /      Patient  verified yes     Visit #   Current / Total 2 10 Total Time   Time   In / Out 11:40 12:32 52   Pain   In / Out 0 0    Subjective Functional Status/Changes: I was very sore after last session but I feel better now.       TREATMENT AREA =  Other low back pain [M54.59]    OBJECTIVE    Modalities Rationale:     decrease pain and improve tissue extensibility to improve patient's ability to progress to PLOF and address remaining functional goals.             -  10 min   []  Ice     [x]  Heat    location/position: Low back, prone    Skin assessment post-treatment (if applicable):    [x]  intact    []  redness- no adverse reaction                 []redness - adverse reaction:           Therapeutic Procedures:  42  Total 42  Total [facility] Totals Reminder: bill using total billable min of TIMED therapeutic procedures (example: do not include dry needle or estim unattended, both untimed codes, in totals to left)  8-22 min = 1 unit; 23-37 min = 2 units; 38-52 min = 3 units; 53-67 min = 4 units; 68-82 min = 5 units   Tx Min Billable or 1:1 Min (if diff from Tx Min) Procedure, Rationale, Specifics   16  64937 Therapeutic Exercise (timed):  increase ROM, strength, coordination, balance, and proprioception to improve patient's ability to progress to PLOF and address remaining functional goals. (see flow sheet as applicable)   Details if applicable:       12  61344 Therapeutic Activity (timed):  use of dynamic activities replicating functional movements to increase ROM, strength, coordination, balance, and proprioception in order to improve patient's ability to progress to PLOF and address remaining functional goals.  (see flow sheet as applicable)   Details if applicable:

## 2024-02-27 ENCOUNTER — HOSPITAL ENCOUNTER (OUTPATIENT)
Facility: HOSPITAL | Age: 64
Setting detail: RECURRING SERIES
Discharge: HOME | End: 2024-03-01
Payer: MEDICARE

## 2024-02-27 PROCEDURE — 97530 THERAPEUTIC ACTIVITIES: CPT

## 2024-02-27 PROCEDURE — 97112 NEUROMUSCULAR REEDUCATION: CPT

## 2024-02-27 NOTE — PROGRESS NOTES
PHYSICAL / OCCUPATIONAL THERAPY - DAILY TREATMENT NOTE (updated )    Patient Name: Doretha Gooden    Date: 2024    : 1960  Insurance: Payor: [facility] MEDICARE / Plan: [employer] DUAL COMPLETE / Product Type: *No Product type* /      Patient  verified yes     Visit #   Current / Total 3 10 Total Time   Time   In / Out 10:31 11:02 31   Pain   In / Out 0 0    Subjective Functional Status/Changes: I am feeling much more energy.       TREATMENT AREA =  Other low back pain [M54.59]    OBJECTIVE    Therapeutic Procedures:  31  Total 31  Total [facility] Totals Reminder: bill using total billable min of TIMED therapeutic procedures (example: do not include dry needle or estim unattended, both untimed codes, in totals to left)  8-22 min = 1 unit; 23-37 min = 2 units; 38-52 min = 3 units; 53-67 min = 4 units; 68-82 min = 5 units   Tx Min Billable or 1:1 Min (if diff from Tx Min) Procedure, Rationale, Specifics   5 5 64499 Therapeutic Exercise (timed):  increase ROM, strength, coordination, balance, and proprioception to improve patient's ability to progress to PLOF and address remaining functional goals. (see flow sheet as applicable)   Details if applicable:       10 10 22487 Therapeutic Activity (timed):  use of dynamic activities replicating functional movements to increase ROM, strength, coordination, balance, and proprioception in order to improve patient's ability to progress to PLOF and address remaining functional goals.  (see flow sheet as applicable)   Details if applicable:       16 16 31034 Neuromuscular Re-Education (timed):  improve balance, coordination, kinesthetic sense, posture, core stability and proprioception to improve patient's ability to develop conscious control of individual muscles and awareness of position of extremities in order to progress to PLOF and address remaining functional goals. (see flow sheet as applicable)     Details if applicable:             [x]  Patient Education

## 2024-04-01 ENCOUNTER — HOSPITAL ENCOUNTER (OUTPATIENT)
Facility: HOSPITAL | Age: 64
Setting detail: RECURRING SERIES
Discharge: HOME | End: 2024-04-04
Payer: MEDICARE

## 2024-04-01 PROCEDURE — 97110 THERAPEUTIC EXERCISES: CPT

## 2024-04-01 PROCEDURE — 97112 NEUROMUSCULAR REEDUCATION: CPT

## 2024-04-01 PROCEDURE — 97530 THERAPEUTIC ACTIVITIES: CPT

## 2024-04-01 NOTE — PROGRESS NOTES
[facility]  [address], [address] Phone , fax    MEDICARE CONTINUED PLAN OF CARE/RECERTIFICATION FOR PHYSICAL THERAPY          Patient Name: Doretha Gooden : 1960   Treatment/Medical Diagnosis: M54.59  OTHER LOWER BACK PAIN   Onset Date: 24 (referral)    Referral Source: Ayaka Johnston APRN * Start of Care (SOC): 24   Prior Hospitalization: See Medical History Provider #: [id number]   Prior Level of Function: Functionally independent, part time  at [employer]    Comorbidities: osteoporosis, DM II, 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, former smoker    Visits from SOC: 10 Missed Visits: 3     SUMMARY OF TREATMENT  Pt is a pleasant 63 y.o. female who presents with c/o low back pain. Treatment has consisted of: therapeutic exercise to improve LE/lumbar strength/mobility; therapeutic activities to improve transfer/lift/carry ability; neuromuscular re-education to improve balance, core stability, neuromuscular control with lifting; physical agent/modality for symptom management; manual therapy for symptom relief and muscle flexibility; patient education to improve symptom management; self Care training; home safety training; stair training, and functional mobility training.    Key Functional Changes/Progress:   Patient has attended PT for 10 sessions for the treatment of low back pain.  The patient demonstrates substantial progress at this time; limited progress observed in past month due to decreased attendance. Colleen demonstrates improved LE strength and decreased pain levels since SOC.  She cont to demo deficits in standing balance and ease of stair negotiation/standing when fatigued.. Additional assessment includes:  Subjective Gains: less back pain, improved standing tolerance, pain levels lower on average  Subjective Deficits: able to stand for 6 hour work shift with mild/moderate pain, difficulty with

## 2024-04-01 NOTE — PROGRESS NOTES
PHYSICAL / OCCUPATIONAL THERAPY - DAILY TREATMENT NOTE (updated )    Patient Name: Doretha Gooden    Date: 2024    : 1960  Insurance: Payor: [facility] MEDICARE / Plan: [employer]HE[employer] DUAL COMPLETE / Product Type: *No Product type* /      Patient  verified yes     Visit #   Current / Total 4 10 Total Time   Time   In / Out 7:42 8:28 46   Pain   In / Out 0 0    Subjective Functional Status/Changes: I am doing better since starting therapy.     TREATMENT AREA =  Other low back pain [M54.59]    OBJECTIVE      Therapeutic Procedures:  46  Total 46  Total [facility] Totals Reminder: bill using total billable min of TIMED therapeutic procedures (example: do not include dry needle or estim unattended, both untimed codes, in totals to left)  8-22 min = 1 unit; 23-37 min = 2 units; 38-52 min = 3 units; 53-67 min = 4 units; 68-82 min = 5 units   Tx Min Billable or 1:1 Min (if diff from Tx Min) Procedure, Rationale, Specifics   10 10 44114 Therapeutic Exercise (timed):  increase ROM, strength, coordination, balance, and proprioception to improve patient's ability to progress to PLOF and address remaining functional goals. (see flow sheet as applicable)   Details if applicable:       20 20 16853 Therapeutic Activity (timed):  use of dynamic activities replicating functional movements to increase ROM, strength, coordination, balance, and proprioception in order to improve patient's ability to progress to PLOF and address remaining functional goals.  (see flow sheet as applicable)   Details if applicable:       16 16 24253 Neuromuscular Re-Education (timed):  improve balance, coordination, kinesthetic sense, posture, core stability and proprioception to improve patient's ability to develop conscious control of individual muscles and awareness of position of extremities in order to progress to PLOF and address remaining functional goals. (see flow sheet as applicable)     Details if applicable:           [x]  Patient

## 2024-04-02 ENCOUNTER — OFFICE VISIT (OUTPATIENT)
Facility: CLINIC | Age: 64
End: 2024-04-02
Payer: MEDICARE

## 2024-04-02 VITALS
TEMPERATURE: 98.1 F | BODY MASS INDEX: 26.63 KG/M2 | HEART RATE: 76 BPM | RESPIRATION RATE: 18 BRPM | OXYGEN SATURATION: 93 % | SYSTOLIC BLOOD PRESSURE: 113 MMHG | DIASTOLIC BLOOD PRESSURE: 68 MMHG | WEIGHT: 156 LBS | HEIGHT: 64 IN

## 2024-04-02 DIAGNOSIS — E11.65 TYPE 2 DIABETES MELLITUS WITH HYPERGLYCEMIA, WITHOUT LONG-TERM CURRENT USE OF INSULIN (HCC): ICD-10-CM

## 2024-04-02 DIAGNOSIS — Z87.898 HX OF MULTIPLE PULMONARY NODULES: ICD-10-CM

## 2024-04-02 DIAGNOSIS — N89.8 VAGINAL LESION: ICD-10-CM

## 2024-04-02 DIAGNOSIS — M81.0 AGE-RELATED OSTEOPOROSIS WITHOUT CURRENT PATHOLOGICAL FRACTURE: ICD-10-CM

## 2024-04-02 DIAGNOSIS — B37.9 YEAST INFECTION: ICD-10-CM

## 2024-04-02 DIAGNOSIS — Z87.891 PERSONAL HISTORY OF TOBACCO USE: ICD-10-CM

## 2024-04-02 DIAGNOSIS — D18.03 LIVER HEMANGIOMA: ICD-10-CM

## 2024-04-02 DIAGNOSIS — Z87.891 FORMER SMOKER: Primary | ICD-10-CM

## 2024-04-02 DIAGNOSIS — E78.5 HYPERLIPIDEMIA, UNSPECIFIED HYPERLIPIDEMIA TYPE: ICD-10-CM

## 2024-04-02 DIAGNOSIS — M47.816 ARTHRITIS, LUMBAR SPINE: ICD-10-CM

## 2024-04-02 PROBLEM — R10.11 RIGHT UPPER QUADRANT ABDOMINAL PAIN: Status: RESOLVED | Noted: 2023-05-05 | Resolved: 2024-04-02

## 2024-04-02 PROBLEM — R63.4 WEIGHT LOSS: Status: RESOLVED | Noted: 2023-02-09 | Resolved: 2024-04-02

## 2024-04-02 PROBLEM — G44.011 INTRACTABLE EPISODIC CLUSTER HEADACHE: Status: RESOLVED | Noted: 2023-05-05 | Resolved: 2024-04-02

## 2024-04-02 PROBLEM — Z91.81 AT HIGH RISK FOR FALLS: Status: RESOLVED | Noted: 2023-10-12 | Resolved: 2024-04-02

## 2024-04-02 PROBLEM — F17.210 CIGARETTE NICOTINE DEPENDENCE WITHOUT COMPLICATION: Status: RESOLVED | Noted: 2023-05-05 | Resolved: 2024-04-02

## 2024-04-02 PROBLEM — J40 BRONCHITIS: Status: RESOLVED | Noted: 2023-06-20 | Resolved: 2024-04-02

## 2024-04-02 LAB — HBA1C MFR BLD: 9.1 %

## 2024-04-02 PROCEDURE — 3051F HG A1C>EQUAL 7.0%<8.0%: CPT | Performed by: NURSE PRACTITIONER

## 2024-04-02 PROCEDURE — G8399 PT W/DXA RESULTS DOCUMENT: HCPCS | Performed by: NURSE PRACTITIONER

## 2024-04-02 PROCEDURE — 83036 HEMOGLOBIN GLYCOSYLATED A1C: CPT | Performed by: NURSE PRACTITIONER

## 2024-04-02 PROCEDURE — G8428 CUR MEDS NOT DOCUMENT: HCPCS | Performed by: NURSE PRACTITIONER

## 2024-04-02 PROCEDURE — 99214 OFFICE O/P EST MOD 30 MIN: CPT | Performed by: NURSE PRACTITIONER

## 2024-04-02 PROCEDURE — G0296 VISIT TO DETERM LDCT ELIG: HCPCS | Performed by: NURSE PRACTITIONER

## 2024-04-02 PROCEDURE — 3017F COLORECTAL CA SCREEN DOC REV: CPT | Performed by: NURSE PRACTITIONER

## 2024-04-02 PROCEDURE — 2022F DILAT RTA XM EVC RTNOPTHY: CPT | Performed by: NURSE PRACTITIONER

## 2024-04-02 PROCEDURE — 1123F ACP DISCUSS/DSCN MKR DOCD: CPT | Performed by: NURSE PRACTITIONER

## 2024-04-02 PROCEDURE — 4004F PT TOBACCO SCREEN RCVD TLK: CPT | Performed by: NURSE PRACTITIONER

## 2024-04-02 PROCEDURE — 1090F PRES/ABSN URINE INCON ASSESS: CPT | Performed by: NURSE PRACTITIONER

## 2024-04-02 PROCEDURE — G8419 CALC BMI OUT NRM PARAM NOF/U: HCPCS | Performed by: NURSE PRACTITIONER

## 2024-04-02 RX ORDER — FLUCONAZOLE 150 MG/1
150 TABLET ORAL ONCE
Qty: 1 TABLET | Refills: 0 | Status: SHIPPED | OUTPATIENT
Start: 2024-04-02 | End: 2024-04-02

## 2024-04-02 ASSESSMENT — ENCOUNTER SYMPTOMS
BACK PAIN: 1
STRIDOR: 0
WHEEZING: 0
DIARRHEA: 0
CONSTIPATION: 0
ABDOMINAL PAIN: 0
COUGH: 0

## 2024-04-02 ASSESSMENT — PATIENT HEALTH QUESTIONNAIRE - PHQ9
SUM OF ALL RESPONSES TO PHQ QUESTIONS 1-9: 0
1. LITTLE INTEREST OR PLEASURE IN DOING THINGS: NOT AT ALL
2. FEELING DOWN, DEPRESSED OR HOPELESS: NOT AT ALL
SUM OF ALL RESPONSES TO PHQ QUESTIONS 1-9: 0
SUM OF ALL RESPONSES TO PHQ QUESTIONS 1-9: 0
SUM OF ALL RESPONSES TO PHQ9 QUESTIONS 1 & 2: 0
SUM OF ALL RESPONSES TO PHQ QUESTIONS 1-9: 0

## 2024-04-02 NOTE — PATIENT INSTRUCTIONS
follow-up, he or she will help you understand what to do next.  After a lung cancer screening, you can go back to your usual activities right away.  A lung cancer screening test can't tell if you have lung cancer. If your results are positive, your doctor can't tell whether an abnormal finding is a harmless nodule, cancer, or something else without doing more tests.  What can you do to help prevent lung cancer?  Some lung cancers can't be prevented. But if you smoke, quitting smoking is the best step you can take to prevent lung cancer. If you want to quit, your doctor can recommend medicines or other ways to help.  Follow-up care is a key part of your treatment and safety. Be sure to make and go to all appointments, and call your doctor if you are having problems. It's also a good idea to know your test results and keep a list of the medicines you take.  Where can you learn more?  Go to https://www.[employer].net/patientEd and enter Q940 to learn more about \"Learning About Lung Cancer Screening.\"  Current as of: October 25, 2023               Content Version: 14.0  © 9138-3050 [employer].   Care instructions adapted under license by [employer]. If you have questions about a medical condition or this instruction, always ask your healthcare professional. [employer] disclaims any warranty or liability for your use of this information.

## 2024-04-02 NOTE — ASSESSMENT & PLAN NOTE
Patient has received OB/GYN referral phone numbers from insurance nurse practitioner and will call today.

## 2024-04-02 NOTE — PROGRESS NOTES
Patient states she has not been to the eye doctor      Doretha Gooden presents today for   Chief Complaint   Patient presents with    Follow-up    Diabetes       Is someone accompanying this pt? no    Is the patient using any DME equipment during OV? no    Depression Screening:       No data to display                Learning Assessment:  Failed to redirect to the Timeline version of the [employer].    Fall Risk  Failed to redirect to the Timeline version of the [employer].    ADL       No data to display                Health Maintenance reviewed and discussed and ordered per Provider.    Health Maintenance Due   Topic Date Due    COVID-19 Vaccine (1) Never done    Shingles vaccine (1 of 2) Never done    Diabetic retinal exam  06/29/2017    Respiratory Syncytial Virus (RSV) Pregnant or age 60 yrs+ (1 - 1-dose 60+ series) Never done    Cervical cancer screen  05/27/2021    Low dose CT lung screening &/or counseling  03/03/2024   .      \"Have you been to the ER, urgent care clinic since your last visit?  Hospitalized since your last visit?\"    no    “Have you seen or consulted any other health care providers outside of [facility] since your last visit?”    no     “Have you had a pap smear?”    no    Date of last Cervical Cancer screen (HPV or PAP): 5/27/2016             Click Here for Release of Records Request

## 2024-04-02 NOTE — ASSESSMENT & PLAN NOTE
Uncontrolled; previous A1c 7.5 but patient has been eating a lot ice cream/sweets   Pt refuses insulin and states will work on diet   Patient only taking once daily; advised to take twice daily due to elevated A1c 9%

## 2024-04-02 NOTE — PROGRESS NOTES
[address] [place] [address]               [phone]      Doretha Gooden is a 63 y.o. female and presents with Follow-up and Diabetes       Assessment/Plan:    1. Former smoker  Assessment & Plan:    Stopped smoking 1 year ago.  2. Hx of multiple pulmonary nodules  Assessment & Plan:    Recheck CT low density; ordered today. Last CT low density 3/2023  3. Age-related osteoporosis without current pathological fracture  4. Type 2 diabetes mellitus with hyperglycemia, without long-term current use of insulin (HCC)  Assessment & Plan:    Uncontrolled; previous A1c 7.5 but patient has been eating a lot ice cream/sweets   Pt refuses insulin and states will work on diet   Patient only taking once daily; advised to take twice daily due to elevated A1c 9%  Orders:  -     AMB POC HEMOGLOBIN A1C  5. Liver hemangioma  Assessment & Plan:    Repeat CT 9/2024 for subcentimeter liver hemangioma   6. Arthritis, lumbar spine  Assessment & Plan:    Patient completing physical therapy   7. Yeast infection  -     fluconazole (DIFLUCAN) 150 MG tablet; Take 1 tablet by mouth once for 1 dose, Disp-1 tablet, R-0Normal  8. Vaginal lesion  Assessment & Plan:    Patient has received OB/GYN referral phone numbers from insurance nurse practitioner and will call today.  9. Personal history of tobacco use  -     ID VISIT TO DISCUSS LUNG CA SCREEN W LDCT  -     CT Lung Screen (Initial/Annual/Baseline); Future  10. Hyperlipidemia, unspecified hyperlipidemia type         Follow up and disposition:   Return in about 3 months (around 7/2/2024).      Subjective:    Labs obtained prior to visit? No  Reviewed with patient? N/A    Patient states is going to OB/GYN for PAP and vaginal lesion  --last PAP in 2016   --patient has list that are in network and will call today     ROS:     Review of Systems   Constitutional:  Negative for chills, fatigue and fever.   HENT: Negative.     Respiratory:  Negative for cough,

## 2024-04-12 ENCOUNTER — COMPREHENSIVE EXAM (OUTPATIENT)
Dept: URBAN - METROPOLITAN AREA CLINIC 1 | Facility: CLINIC | Age: 64
End: 2024-04-12

## 2024-04-12 ENCOUNTER — HOSPITAL ENCOUNTER (OUTPATIENT)
Facility: HOSPITAL | Age: 64
Setting detail: RECURRING SERIES
Discharge: HOME | End: 2024-04-15
Payer: MEDICARE

## 2024-04-12 DIAGNOSIS — H49.22: ICD-10-CM

## 2024-04-12 DIAGNOSIS — H18.513: ICD-10-CM

## 2024-04-12 DIAGNOSIS — Z96.1: ICD-10-CM

## 2024-04-12 DIAGNOSIS — Z79.4: ICD-10-CM

## 2024-04-12 DIAGNOSIS — H26.493: ICD-10-CM

## 2024-04-12 DIAGNOSIS — E11.9: ICD-10-CM

## 2024-04-12 DIAGNOSIS — G50.0: ICD-10-CM

## 2024-04-12 PROCEDURE — 97140 MANUAL THERAPY 1/> REGIONS: CPT

## 2024-04-12 PROCEDURE — 99214 OFFICE O/P EST MOD 30 MIN: CPT

## 2024-04-12 PROCEDURE — 97112 NEUROMUSCULAR REEDUCATION: CPT

## 2024-04-12 ASSESSMENT — VISUAL ACUITY
OD_SC: 20/25
OS_SC: 20/25

## 2024-04-12 ASSESSMENT — TONOMETRY
OD_IOP_MMHG: 14
OS_IOP_MMHG: 14

## 2024-04-12 NOTE — PROGRESS NOTES
include and are to be achieved in   8-12  weeks:  - Goal: Pt to report no more than 2/10 pain at worst in low back to improve ease with household chores.  Status at last note/certification: 4/10 pain at worst   Current: no pain currently 4/12/2024  - Goal: Pt will demo B SLS for at least 30 seconds without LOB to improve safety with obstacle negotiation and curb ascent/descent.  Status at last note/certification: right 4-5\", left 4\"   - Goal: Pt will demo bilateral hip extension MMT of 5/5 without increased pain to improve ease of ADLs..  Status at last note/certification: 4+/5 B   - Goal: Patient will increase FOTO score to at least 67 pts to demonstrate increased functional mobility.  Status at last note/certification: FOTO 62 pts    PLAN  Yes  Continue plan of care  []  Upgrade activities as tolerated  []  Discharge due to :  []  Other:    Kandi Lomeli, PT    4/12/2024    6:51 AM    Future Appointments   Date Time Provider Department Center   4/16/2024 10:00 AM [facility] CT RM 2 [facility]   4/19/2024  7:40 AM Emil Green, PT [facility]   6/28/2024  9:45 AM [facility] BX RM 1 [facility]

## 2024-04-16 ENCOUNTER — HOSPITAL ENCOUNTER (OUTPATIENT)
Facility: HOSPITAL | Age: 64
Discharge: HOME | End: 2024-04-19
Payer: MEDICARE

## 2024-04-16 DIAGNOSIS — Z87.891 PERSONAL HISTORY OF TOBACCO USE: ICD-10-CM

## 2024-04-16 PROCEDURE — 71271 CT THORAX LUNG CANCER SCR C-: CPT

## 2024-04-19 ENCOUNTER — HOSPITAL ENCOUNTER (OUTPATIENT)
Facility: HOSPITAL | Age: 64
Setting detail: RECURRING SERIES
End: 2024-04-19
Payer: MEDICARE

## 2024-06-07 ENCOUNTER — TELEPHONE (OUTPATIENT)
Facility: CLINIC | Age: 64
End: 2024-06-07

## 2024-06-07 NOTE — TELEPHONE ENCOUNTER
Reviewed with patient regarding liver lesion and likely benign cyst; radiologist recommends follow-up CT in 6 months-10/2024. Also, this provider sent patient a [employer] message on 4- regarding CT lung cancer screening showing stable nodules/repeat in 12 months but patient did not read. Patient aware of status and repeat CT 4/2025. Advised patient to schedule follow-up appointment 7/2024 via [employer].

## 2024-06-10 RX ORDER — EMPAGLIFLOZIN AND METFORMIN HYDROCHLORIDE 12.5; 1 MG/1; MG/1
1 TABLET ORAL 2 TIMES DAILY
Qty: 180 TABLET | Refills: 0 | Status: SHIPPED | OUTPATIENT
Start: 2024-06-10

## 2024-06-10 RX ORDER — EMPAGLIFLOZIN AND METFORMIN HYDROCHLORIDE 12.5; 1 MG/1; MG/1
1 TABLET ORAL 2 TIMES DAILY
Qty: 180 TABLET | Refills: 0 | OUTPATIENT
Start: 2024-06-10

## 2024-06-21 ENCOUNTER — TELEPHONE (OUTPATIENT)
Dept: PHARMACY | Facility: CLINIC | Age: 64
End: 2024-06-21

## 2024-06-21 NOTE — TELEPHONE ENCOUNTER
[facility] CLINICAL PHARMACY: ADHERENCE REVIEW  Identified care gap per [place] fills with [facility]: Diabetes adherence    Medicare and residential Dual Special Needs Plan - MRDSNP  MA-PCPi  Per insurer report, LIS-3 - co-pays are $0.00 through all phases of the benefit, regardless of the days' supply dispensed.  Patient also appears to be prescribed: Statin    ASSESSMENT    DIABETES ADHERENCE    Insurance Records claims through  24  (Prior Year PDC = not reported; YTD PDC = FIRST FILL; Potential Fail Date: 07.15.24):   SYNJARDY TAB last filled on 24 for 90 day supply. Next refill due: 24    Prescribed si tablet/capsule twice daily    Per Reconcile Dispense History and Insurer Portal: last filled on 24 for 90 day supply.     Per [facility]: last picked up on 24 for 90 day supply. Billed through [employer] refills remaining. Next Visit: 07.15.24    Lab Results   Component Value Date    LABA1C 7.5 (H) 2024    LABA1C 7.7 (H) 10/17/2023    LABA1C 7.0 (H) 2023       STATIN ADHERENCE    Insurance Records claims through  24  (Prior Year PDC = not reported; YTD PDC = 100%; Potential Fail Date: 24):   ATORVASTATIN TAB 10 MG last filled on 24 for 90 day supply. Next refill due: 07.10.24    Prescribed si tablet/capsule daily    Per Reconcile Dispense History and Insurer Portal: last filled on 24 for 90 day supply.     Per [facility]: last picked up on 24 for 90 day supply. Billed through [employer] refills remaining. Next Visit: 07.15.24    Lab Results   Component Value Date    CHOL 253 (H) 10/17/2023    TRIG 189 (H) 10/17/2023    HDL 43 10/17/2023     Lab Results   Component Value Date    .2 (H) 10/17/2023      ALT   Date Value Ref Range Status   2024 28 13 - 56 U/L Final     AST   Date Value Ref Range Status   2024 16 10 - 38 U/L Final     The ASCVD Risk score (Miguel DK, et al., 2019) failed to calculate

## 2024-07-01 ENCOUNTER — HOSPITAL ENCOUNTER (OUTPATIENT)
Dept: WOMENS IMAGING | Facility: HOSPITAL | Age: 64
Discharge: HOME | End: 2024-07-04
Payer: MEDICARE

## 2024-07-01 DIAGNOSIS — Z12.31 SCREENING MAMMOGRAM FOR BREAST CANCER: ICD-10-CM

## 2024-07-01 PROCEDURE — 77063 BREAST TOMOSYNTHESIS BI: CPT

## 2024-07-02 RX ORDER — BUTALBITAL, ACETAMINOPHEN AND CAFFEINE 50; 325; 40 MG/1; MG/1; MG/1
TABLET ORAL
COMMUNITY

## 2024-07-02 RX ORDER — LIDOCAINE 50 MG/G
PATCH TOPICAL
COMMUNITY

## 2024-07-02 RX ORDER — ESTRADIOL 0.1 MG/G
CREAM VAGINAL
COMMUNITY
Start: 2024-06-28

## 2024-07-15 ENCOUNTER — HOSPITAL ENCOUNTER (OUTPATIENT)
Facility: HOSPITAL | Age: 64
Setting detail: SPECIMEN
Discharge: HOME | End: 2024-07-18
Payer: MEDICARE

## 2024-07-15 ENCOUNTER — OFFICE VISIT (OUTPATIENT)
Facility: CLINIC | Age: 64
End: 2024-07-15
Payer: MEDICARE

## 2024-07-15 VITALS
HEIGHT: 64 IN | BODY MASS INDEX: 26.63 KG/M2 | OXYGEN SATURATION: 93 % | DIASTOLIC BLOOD PRESSURE: 68 MMHG | TEMPERATURE: 98.2 F | SYSTOLIC BLOOD PRESSURE: 115 MMHG | HEART RATE: 66 BPM | RESPIRATION RATE: 16 BRPM | WEIGHT: 156 LBS

## 2024-07-15 DIAGNOSIS — R87.69 VULVAR HIGH-GRADE SQUAMOUS INTRAEPITHELIAL LESION (HGSIL): ICD-10-CM

## 2024-07-15 DIAGNOSIS — Z87.898 HX OF MULTIPLE PULMONARY NODULES: ICD-10-CM

## 2024-07-15 DIAGNOSIS — D18.03 LIVER HEMANGIOMA: ICD-10-CM

## 2024-07-15 DIAGNOSIS — M81.0 OSTEOPOROSIS, UNSPECIFIED OSTEOPOROSIS TYPE, UNSPECIFIED PATHOLOGICAL FRACTURE PRESENCE: ICD-10-CM

## 2024-07-15 DIAGNOSIS — D07.1 VULVAR INTRAEPITHELIAL NEOPLASIA (VIN) GRADE 3: ICD-10-CM

## 2024-07-15 DIAGNOSIS — F41.9 ANXIETY AND DEPRESSION: ICD-10-CM

## 2024-07-15 DIAGNOSIS — Z87.891 FORMER SMOKER: ICD-10-CM

## 2024-07-15 DIAGNOSIS — B35.1 TOENAIL FUNGUS: ICD-10-CM

## 2024-07-15 DIAGNOSIS — F32.A ANXIETY AND DEPRESSION: ICD-10-CM

## 2024-07-15 DIAGNOSIS — E78.5 HYPERLIPIDEMIA, UNSPECIFIED HYPERLIPIDEMIA TYPE: ICD-10-CM

## 2024-07-15 DIAGNOSIS — E11.65 TYPE 2 DIABETES MELLITUS WITH HYPERGLYCEMIA, WITHOUT LONG-TERM CURRENT USE OF INSULIN (HCC): Primary | ICD-10-CM

## 2024-07-15 DIAGNOSIS — N89.3: ICD-10-CM

## 2024-07-15 PROBLEM — K76.9 LIVER LESION: Status: RESOLVED | Noted: 2023-05-26 | Resolved: 2024-07-15

## 2024-07-15 PROBLEM — B37.9 YEAST INFECTION: Status: RESOLVED | Noted: 2024-04-02 | Resolved: 2024-07-15

## 2024-07-15 PROBLEM — R91.1 LUNG NODULE: Status: RESOLVED | Noted: 2024-01-02 | Resolved: 2024-07-15

## 2024-07-15 PROBLEM — F32.0 MAJOR DEPRESSIVE DISORDER, SINGLE EPISODE, MILD (HCC): Status: RESOLVED | Noted: 2023-05-05 | Resolved: 2024-07-15

## 2024-07-15 LAB
CHOLEST SERPL-MCNC: 189 MG/DL
HBA1C MFR BLD: 7.4 %
HDLC SERPL-MCNC: 41 MG/DL (ref 40–60)
HDLC SERPL: 4.6 (ref 0–5)
LDLC SERPL CALC-MCNC: 118.6 MG/DL (ref 0–100)
LIPID PANEL: ABNORMAL
TRIGL SERPL-MCNC: 147 MG/DL
VLDLC SERPL CALC-MCNC: 29.4 MG/DL

## 2024-07-15 PROCEDURE — 80061 LIPID PANEL: CPT

## 2024-07-15 PROCEDURE — 3017F COLORECTAL CA SCREEN DOC REV: CPT | Performed by: NURSE PRACTITIONER

## 2024-07-15 PROCEDURE — 99214 OFFICE O/P EST MOD 30 MIN: CPT | Performed by: NURSE PRACTITIONER

## 2024-07-15 PROCEDURE — 1123F ACP DISCUSS/DSCN MKR DOCD: CPT | Performed by: NURSE PRACTITIONER

## 2024-07-15 PROCEDURE — G8428 CUR MEDS NOT DOCUMENT: HCPCS | Performed by: NURSE PRACTITIONER

## 2024-07-15 PROCEDURE — G8419 CALC BMI OUT NRM PARAM NOF/U: HCPCS | Performed by: NURSE PRACTITIONER

## 2024-07-15 PROCEDURE — 3051F HG A1C>EQUAL 7.0%<8.0%: CPT | Performed by: NURSE PRACTITIONER

## 2024-07-15 PROCEDURE — G8399 PT W/DXA RESULTS DOCUMENT: HCPCS | Performed by: NURSE PRACTITIONER

## 2024-07-15 PROCEDURE — 83036 HEMOGLOBIN GLYCOSYLATED A1C: CPT | Performed by: NURSE PRACTITIONER

## 2024-07-15 PROCEDURE — 1090F PRES/ABSN URINE INCON ASSESS: CPT | Performed by: NURSE PRACTITIONER

## 2024-07-15 PROCEDURE — 2022F DILAT RTA XM EVC RTNOPTHY: CPT | Performed by: NURSE PRACTITIONER

## 2024-07-15 PROCEDURE — 1036F TOBACCO NON-USER: CPT | Performed by: NURSE PRACTITIONER

## 2024-07-15 PROCEDURE — 36415 COLL VENOUS BLD VENIPUNCTURE: CPT

## 2024-07-15 RX ORDER — ALENDRONATE SODIUM 70 MG/1
70 TABLET ORAL
Qty: 12 TABLET | Refills: 1 | Status: SHIPPED | OUTPATIENT
Start: 2024-07-15

## 2024-07-15 RX ORDER — MULTIVIT WITH MINERALS/LUTEIN
250 TABLET ORAL DAILY
COMMUNITY

## 2024-07-15 RX ORDER — CICLOPIROX 80 MG/ML
SOLUTION TOPICAL
Qty: 6 ML | Refills: 0 | Status: SHIPPED | OUTPATIENT
Start: 2024-07-15

## 2024-07-15 RX ORDER — UBIDECARENONE 75 MG
50 CAPSULE ORAL DAILY
COMMUNITY

## 2024-07-15 RX ORDER — CHOLECALCIFEROL (VITAMIN D3) 125 MCG
3 CAPSULE ORAL DAILY
COMMUNITY

## 2024-07-15 SDOH — ECONOMIC STABILITY: FOOD INSECURITY: WITHIN THE PAST 12 MONTHS, THE FOOD YOU BOUGHT JUST DIDN'T LAST AND YOU DIDN'T HAVE MONEY TO GET MORE.: NEVER TRUE

## 2024-07-15 SDOH — ECONOMIC STABILITY: FOOD INSECURITY: WITHIN THE PAST 12 MONTHS, YOU WORRIED THAT YOUR FOOD WOULD RUN OUT BEFORE YOU GOT MONEY TO BUY MORE.: NEVER TRUE

## 2024-07-15 SDOH — ECONOMIC STABILITY: INCOME INSECURITY: HOW HARD IS IT FOR YOU TO PAY FOR THE VERY BASICS LIKE FOOD, HOUSING, MEDICAL CARE, AND HEATING?: NOT VERY HARD

## 2024-07-15 NOTE — ASSESSMENT & PLAN NOTE
Monitored by specialist- no acute findings meriting change in the plan  Patient is seeing GYN-ONC tomorrow and will have precancerous lesion removed.

## 2024-07-16 PROBLEM — D07.1 VULVAR INTRAEPITHELIAL NEOPLASIA (VIN) GRADE 3: Status: ACTIVE | Noted: 2024-07-09

## 2024-07-17 PROBLEM — R87.69 VULVAR HIGH-GRADE SQUAMOUS INTRAEPITHELIAL LESION (HGSIL): Status: ACTIVE | Noted: 2024-07-17

## 2024-07-17 RX ORDER — LIDOCAINE HYDROCHLORIDE 20 MG/ML
JELLY TOPICAL
COMMUNITY
Start: 2024-07-16

## 2024-07-31 PROBLEM — F43.21 GRIEF: Status: RESOLVED | Noted: 2023-02-09 | Resolved: 2024-07-31

## 2024-07-31 ASSESSMENT — ENCOUNTER SYMPTOMS
CONSTIPATION: 0
BACK PAIN: 0
RESPIRATORY NEGATIVE: 1
WHEEZING: 0
DIARRHEA: 0
ABDOMINAL PAIN: 0
CHEST TIGHTNESS: 0
COUGH: 0
STRIDOR: 0

## 2024-09-15 RX ORDER — EMPAGLIFLOZIN AND METFORMIN HYDROCHLORIDE 12.5; 1 MG/1; MG/1
1 TABLET ORAL 2 TIMES DAILY
Qty: 180 TABLET | Refills: 0 | Status: SHIPPED | OUTPATIENT
Start: 2024-09-15

## 2024-09-16 ENCOUNTER — OFFICE VISIT (OUTPATIENT)
Facility: CLINIC | Age: 64
End: 2024-09-16
Payer: MEDICARE

## 2024-09-16 ENCOUNTER — HOSPITAL ENCOUNTER (OUTPATIENT)
Facility: HOSPITAL | Age: 64
Discharge: HOME | End: 2024-09-19

## 2024-09-16 VITALS
TEMPERATURE: 98 F | HEIGHT: 64 IN | RESPIRATION RATE: 16 BRPM | SYSTOLIC BLOOD PRESSURE: 113 MMHG | BODY MASS INDEX: 26.32 KG/M2 | HEART RATE: 79 BPM | OXYGEN SATURATION: 94 % | WEIGHT: 154.2 LBS | DIASTOLIC BLOOD PRESSURE: 70 MMHG

## 2024-09-16 DIAGNOSIS — Z82.49 FAMILY HISTORY OF PULMONARY EMBOLISM: ICD-10-CM

## 2024-09-16 DIAGNOSIS — M65.321 TRIGGER FINGER OF ALL DIGITS OF RIGHT HAND: ICD-10-CM

## 2024-09-16 DIAGNOSIS — Z87.891 FORMER SMOKER: Primary | ICD-10-CM

## 2024-09-16 DIAGNOSIS — M65.351 TRIGGER FINGER OF ALL DIGITS OF RIGHT HAND: ICD-10-CM

## 2024-09-16 DIAGNOSIS — Z78.9 HISTORY OF RECENT AIR TRAVEL: ICD-10-CM

## 2024-09-16 DIAGNOSIS — M65.341 TRIGGER FINGER OF ALL DIGITS OF RIGHT HAND: ICD-10-CM

## 2024-09-16 DIAGNOSIS — M65.331 TRIGGER FINGER OF ALL DIGITS OF RIGHT HAND: ICD-10-CM

## 2024-09-16 DIAGNOSIS — M65.311 TRIGGER FINGER OF ALL DIGITS OF RIGHT HAND: ICD-10-CM

## 2024-09-16 DIAGNOSIS — E11.65 TYPE 2 DIABETES MELLITUS WITH HYPERGLYCEMIA, WITHOUT LONG-TERM CURRENT USE OF INSULIN (HCC): ICD-10-CM

## 2024-09-16 LAB — LABCORP SPECIMEN COLLECTION: NORMAL

## 2024-09-16 PROCEDURE — 3017F COLORECTAL CA SCREEN DOC REV: CPT | Performed by: NURSE PRACTITIONER

## 2024-09-16 PROCEDURE — G8399 PT W/DXA RESULTS DOCUMENT: HCPCS | Performed by: NURSE PRACTITIONER

## 2024-09-16 PROCEDURE — 99214 OFFICE O/P EST MOD 30 MIN: CPT | Performed by: NURSE PRACTITIONER

## 2024-09-16 PROCEDURE — 1036F TOBACCO NON-USER: CPT | Performed by: NURSE PRACTITIONER

## 2024-09-16 PROCEDURE — 2022F DILAT RTA XM EVC RTNOPTHY: CPT | Performed by: NURSE PRACTITIONER

## 2024-09-16 PROCEDURE — 99001 SPECIMEN HANDLING PT-LAB: CPT

## 2024-09-16 PROCEDURE — G8419 CALC BMI OUT NRM PARAM NOF/U: HCPCS | Performed by: NURSE PRACTITIONER

## 2024-09-16 PROCEDURE — 3051F HG A1C>EQUAL 7.0%<8.0%: CPT | Performed by: NURSE PRACTITIONER

## 2024-09-16 PROCEDURE — 1090F PRES/ABSN URINE INCON ASSESS: CPT | Performed by: NURSE PRACTITIONER

## 2024-09-16 PROCEDURE — 1123F ACP DISCUSS/DSCN MKR DOCD: CPT | Performed by: NURSE PRACTITIONER

## 2024-09-16 PROCEDURE — G8427 DOCREV CUR MEDS BY ELIG CLIN: HCPCS | Performed by: NURSE PRACTITIONER

## 2024-09-16 ASSESSMENT — PATIENT HEALTH QUESTIONNAIRE - PHQ9
SUM OF ALL RESPONSES TO PHQ QUESTIONS 1-9: 0
SUM OF ALL RESPONSES TO PHQ QUESTIONS 1-9: 0
2. FEELING DOWN, DEPRESSED OR HOPELESS: NOT AT ALL
SUM OF ALL RESPONSES TO PHQ9 QUESTIONS 1 & 2: 0
SUM OF ALL RESPONSES TO PHQ QUESTIONS 1-9: 0
SUM OF ALL RESPONSES TO PHQ QUESTIONS 1-9: 0
1. LITTLE INTEREST OR PLEASURE IN DOING THINGS: NOT AT ALL

## 2024-09-17 LAB — SPECIMEN STATUS REPORT: NORMAL

## 2024-09-19 ENCOUNTER — TELEPHONE (OUTPATIENT)
Facility: CLINIC | Age: 64
End: 2024-09-19

## 2024-09-19 DIAGNOSIS — R22.41 KNEE MASS, RIGHT: ICD-10-CM

## 2024-09-19 DIAGNOSIS — M25.561 ACUTE PAIN OF RIGHT KNEE: ICD-10-CM

## 2024-09-19 DIAGNOSIS — I87.2 VENOUS REFLUX: Primary | ICD-10-CM

## 2024-09-20 ENCOUNTER — OFFICE VISIT (OUTPATIENT)
Age: 64
End: 2024-09-20

## 2024-09-20 VITALS — WEIGHT: 156.4 LBS | BODY MASS INDEX: 26.7 KG/M2 | HEIGHT: 64 IN

## 2024-09-20 DIAGNOSIS — M65.331 TRIGGER FINGER, RIGHT MIDDLE FINGER: Primary | ICD-10-CM

## 2024-09-20 DIAGNOSIS — M72.0 DUPUYTREN'S DISEASE OF PALM WITH NODULES WITHOUT CONTRACTURE: ICD-10-CM

## 2024-09-20 DIAGNOSIS — M79.646 PAIN OF FINGER, UNSPECIFIED LATERALITY: ICD-10-CM

## 2024-09-20 DIAGNOSIS — M18.11 PRIMARY OSTEOARTHRITIS OF FIRST CARPOMETACARPAL JOINT OF RIGHT HAND: ICD-10-CM

## 2024-09-20 RX ORDER — LIDOCAINE HYDROCHLORIDE 10 MG/ML
0.5 INJECTION, SOLUTION INFILTRATION; PERINEURAL ONCE
Status: COMPLETED | OUTPATIENT
Start: 2024-09-20 | End: 2024-09-20

## 2024-09-20 RX ADMIN — LIDOCAINE HYDROCHLORIDE 0.5 ML: 10 INJECTION, SOLUTION INFILTRATION; PERINEURAL at 09:02

## 2024-09-26 ASSESSMENT — ENCOUNTER SYMPTOMS
STRIDOR: 0
CONSTIPATION: 0
CHEST TIGHTNESS: 0
RESPIRATORY NEGATIVE: 1
ABDOMINAL PAIN: 0
DIARRHEA: 0
BACK PAIN: 0
COUGH: 0
WHEEZING: 0

## 2024-11-01 NOTE — PATIENT DISCUSSION
Defer ROZ PI given scheduling Phaco.
Discussed the importance of blood sugar control in the prevention of ocular complications.
In depth discussion of the risk of permanent blindness if not compliant with diabetic treatment including medications, monitoring/controlling blood sugars and regular follow up exams.
No evidence of Diabetic Retinopathy.
Patient advised to call if any problems, questions, or concerns.
Patient understands condition, prognosis and need for follow up care.
Recent MRI negative.
Smoking cessation discussed.
Visually Significant (secondary to glare), discussed the risks, benefits, alternatives, and limitations of cataract surgery. The patient stated a full understanding and a desire to proceed with the procedure. The patient will need to return for pre-op appointment with cataract measurements and to have any additional questions answered, and start pre-operative eye drops as directed.  Phaco OS then OD. *Probable Standard. Not MF candidate* Otherwise follow up YAG PI OD then OS.
Chronic illness

## 2024-11-02 DIAGNOSIS — E78.49 OTHER HYPERLIPIDEMIA: ICD-10-CM

## 2024-11-03 RX ORDER — GABAPENTIN 300 MG/1
CAPSULE ORAL
COMMUNITY
Start: 2024-10-02

## 2024-11-03 RX ORDER — ATORVASTATIN CALCIUM 10 MG/1
10 TABLET, FILM COATED ORAL DAILY
Qty: 90 TABLET | Refills: 0 | Status: SHIPPED | OUTPATIENT
Start: 2024-11-03

## 2024-11-05 RX ORDER — EMPAGLIFLOZIN AND METFORMIN HYDROCHLORIDE 12.5; 1 MG/1; MG/1
1 TABLET ORAL 2 TIMES DAILY
Qty: 180 TABLET | Refills: 0 | Status: SHIPPED | OUTPATIENT
Start: 2024-11-05

## 2024-11-05 RX ORDER — EMPAGLIFLOZIN AND METFORMIN HYDROCHLORIDE 12.5; 1 MG/1; MG/1
1 TABLET ORAL 2 TIMES DAILY
Qty: 180 TABLET | Refills: 0 | OUTPATIENT
Start: 2024-11-05

## 2025-01-28 ENCOUNTER — HOSPITAL ENCOUNTER (OUTPATIENT)
Facility: HOSPITAL | Age: 65
Setting detail: SPECIMEN
Discharge: HOME | End: 2025-01-31
Payer: MEDICARE

## 2025-01-28 ENCOUNTER — OFFICE VISIT (OUTPATIENT)
Facility: CLINIC | Age: 65
End: 2025-01-28

## 2025-01-28 VITALS
HEART RATE: 77 BPM | RESPIRATION RATE: 16 BRPM | OXYGEN SATURATION: 95 % | TEMPERATURE: 97.7 F | DIASTOLIC BLOOD PRESSURE: 67 MMHG | HEIGHT: 64 IN | BODY MASS INDEX: 28.51 KG/M2 | WEIGHT: 167 LBS | SYSTOLIC BLOOD PRESSURE: 103 MMHG

## 2025-01-28 DIAGNOSIS — D18.03 LIVER HEMANGIOMA: ICD-10-CM

## 2025-01-28 DIAGNOSIS — E78.5 HYPERLIPIDEMIA, UNSPECIFIED HYPERLIPIDEMIA TYPE: ICD-10-CM

## 2025-01-28 DIAGNOSIS — Z87.898 HX OF MULTIPLE PULMONARY NODULES: ICD-10-CM

## 2025-01-28 DIAGNOSIS — Z00.00 MEDICARE ANNUAL WELLNESS VISIT, SUBSEQUENT: ICD-10-CM

## 2025-01-28 DIAGNOSIS — Z87.898 HISTORY OF VERTIGO: ICD-10-CM

## 2025-01-28 DIAGNOSIS — F32.A ANXIETY AND DEPRESSION: ICD-10-CM

## 2025-01-28 DIAGNOSIS — Z87.891 FORMER SMOKER: Primary | ICD-10-CM

## 2025-01-28 DIAGNOSIS — D07.1 VULVAR INTRAEPITHELIAL NEOPLASIA (VIN) GRADE 3: ICD-10-CM

## 2025-01-28 DIAGNOSIS — E11.65 TYPE 2 DIABETES MELLITUS WITH HYPERGLYCEMIA, WITHOUT LONG-TERM CURRENT USE OF INSULIN (HCC): ICD-10-CM

## 2025-01-28 DIAGNOSIS — F41.9 ANXIETY AND DEPRESSION: ICD-10-CM

## 2025-01-28 DIAGNOSIS — M81.0 AGE-RELATED OSTEOPOROSIS WITHOUT CURRENT PATHOLOGICAL FRACTURE: ICD-10-CM

## 2025-01-28 PROBLEM — M65.351 TRIGGER FINGER OF ALL DIGITS OF RIGHT HAND: Status: RESOLVED | Noted: 2024-09-16 | Resolved: 2025-01-28

## 2025-01-28 PROBLEM — N89.3: Status: RESOLVED | Noted: 2024-04-02 | Resolved: 2025-01-28

## 2025-01-28 PROBLEM — M65.331 TRIGGER FINGER OF ALL DIGITS OF RIGHT HAND: Status: RESOLVED | Noted: 2024-09-16 | Resolved: 2025-01-28

## 2025-01-28 PROBLEM — R87.69 VULVAR HIGH-GRADE SQUAMOUS INTRAEPITHELIAL LESION (HGSIL): Status: RESOLVED | Noted: 2024-07-17 | Resolved: 2025-01-28

## 2025-01-28 PROBLEM — B35.1 TOENAIL FUNGUS: Status: RESOLVED | Noted: 2024-01-02 | Resolved: 2025-01-28

## 2025-01-28 PROBLEM — M65.321 TRIGGER FINGER OF ALL DIGITS OF RIGHT HAND: Status: RESOLVED | Noted: 2024-09-16 | Resolved: 2025-01-28

## 2025-01-28 PROBLEM — M65.341 TRIGGER FINGER OF ALL DIGITS OF RIGHT HAND: Status: RESOLVED | Noted: 2024-09-16 | Resolved: 2025-01-28

## 2025-01-28 PROBLEM — M25.561 ACUTE PAIN OF RIGHT KNEE: Status: RESOLVED | Noted: 2024-09-19 | Resolved: 2025-01-28

## 2025-01-28 PROBLEM — M65.311 TRIGGER FINGER OF ALL DIGITS OF RIGHT HAND: Status: RESOLVED | Noted: 2024-09-16 | Resolved: 2025-01-28

## 2025-01-28 PROBLEM — Z78.9 HISTORY OF RECENT AIR TRAVEL: Status: RESOLVED | Noted: 2024-09-16 | Resolved: 2025-01-28

## 2025-01-28 LAB
ALBUMIN SERPL-MCNC: 4 G/DL (ref 3.4–5)
ALBUMIN/GLOB SERPL: 1.2 (ref 0.8–1.7)
ALP SERPL-CCNC: 82 U/L (ref 45–117)
ALT SERPL-CCNC: 27 U/L (ref 13–56)
ANION GAP SERPL CALC-SCNC: 9 MMOL/L (ref 3–18)
AST SERPL-CCNC: 20 U/L (ref 10–38)
BILIRUB SERPL-MCNC: 1 MG/DL (ref 0.2–1)
BUN SERPL-MCNC: 21 MG/DL (ref 7–18)
BUN/CREAT SERPL: 32 (ref 12–20)
CALCIUM SERPL-MCNC: 9.3 MG/DL (ref 8.5–10.1)
CHLORIDE SERPL-SCNC: 107 MMOL/L (ref 100–111)
CHOLEST SERPL-MCNC: 214 MG/DL
CO2 SERPL-SCNC: 24 MMOL/L (ref 21–32)
CREAT SERPL-MCNC: 0.65 MG/DL (ref 0.6–1.3)
CREAT UR-MCNC: 73 MG/DL (ref 30–125)
ERYTHROCYTE [DISTWIDTH] IN BLOOD BY AUTOMATED COUNT: 13.2 % (ref 11.6–14.5)
EST. AVERAGE GLUCOSE BLD GHB EST-MCNC: 209 MG/DL
GLOBULIN SER CALC-MCNC: 3.3 G/DL (ref 2–4)
GLUCOSE SERPL-MCNC: 152 MG/DL (ref 74–99)
HBA1C MFR BLD: 8.9 % (ref 4.2–5.6)
HCT VFR BLD AUTO: 48.6 % (ref 35–45)
HDLC SERPL-MCNC: 38 MG/DL (ref 40–60)
HDLC SERPL: 5.6 (ref 0–5)
HGB BLD-MCNC: 15.3 G/DL (ref 12–16)
LDLC SERPL CALC-MCNC: 113.2 MG/DL (ref 0–100)
LIPID PANEL: ABNORMAL
MCH RBC QN AUTO: 28.7 PG (ref 24–34)
MCHC RBC AUTO-ENTMCNC: 31.5 G/DL (ref 31–37)
MCV RBC AUTO: 91.2 FL (ref 78–100)
MICROALBUMIN UR-MCNC: 1.04 MG/DL (ref 0–3)
MICROALBUMIN/CREAT UR-RTO: 14 MG/G (ref 0–30)
NRBC # BLD: 0 K/UL (ref 0–0.01)
NRBC BLD-RTO: 0 PER 100 WBC
PLATELET # BLD AUTO: 277 K/UL (ref 135–420)
PMV BLD AUTO: 9.9 FL (ref 9.2–11.8)
POTASSIUM SERPL-SCNC: 4.1 MMOL/L (ref 3.5–5.5)
PROT SERPL-MCNC: 7.3 G/DL (ref 6.4–8.2)
RBC # BLD AUTO: 5.33 M/UL (ref 4.2–5.3)
SODIUM SERPL-SCNC: 140 MMOL/L (ref 136–145)
TRIGL SERPL-MCNC: 314 MG/DL
TSH SERPL DL<=0.05 MIU/L-ACNC: 1.63 UIU/ML (ref 0.36–3.74)
VLDLC SERPL CALC-MCNC: 62.8 MG/DL
WBC # BLD AUTO: 7.8 K/UL (ref 4.6–13.2)

## 2025-01-28 PROCEDURE — 80061 LIPID PANEL: CPT

## 2025-01-28 PROCEDURE — 82570 ASSAY OF URINE CREATININE: CPT

## 2025-01-28 PROCEDURE — 83036 HEMOGLOBIN GLYCOSYLATED A1C: CPT

## 2025-01-28 PROCEDURE — 85027 COMPLETE CBC AUTOMATED: CPT

## 2025-01-28 PROCEDURE — 36415 COLL VENOUS BLD VENIPUNCTURE: CPT

## 2025-01-28 PROCEDURE — 82043 UR ALBUMIN QUANTITATIVE: CPT

## 2025-01-28 PROCEDURE — 84443 ASSAY THYROID STIM HORMONE: CPT

## 2025-01-28 PROCEDURE — 80053 COMPREHEN METABOLIC PANEL: CPT

## 2025-01-28 RX ORDER — MECLIZINE HYDROCHLORIDE 25 MG/1
25 TABLET ORAL 3 TIMES DAILY PRN
COMMUNITY

## 2025-01-28 SDOH — ECONOMIC STABILITY: FOOD INSECURITY: WITHIN THE PAST 12 MONTHS, YOU WORRIED THAT YOUR FOOD WOULD RUN OUT BEFORE YOU GOT MONEY TO BUY MORE.: NEVER TRUE

## 2025-01-28 SDOH — ECONOMIC STABILITY: FOOD INSECURITY: WITHIN THE PAST 12 MONTHS, THE FOOD YOU BOUGHT JUST DIDN'T LAST AND YOU DIDN'T HAVE MONEY TO GET MORE.: NEVER TRUE

## 2025-01-28 ASSESSMENT — PATIENT HEALTH QUESTIONNAIRE - PHQ9
6. FEELING BAD ABOUT YOURSELF - OR THAT YOU ARE A FAILURE OR HAVE LET YOURSELF OR YOUR FAMILY DOWN: NOT AT ALL
7. TROUBLE CONCENTRATING ON THINGS, SUCH AS READING THE NEWSPAPER OR WATCHING TELEVISION: NOT AT ALL
SUM OF ALL RESPONSES TO PHQ9 QUESTIONS 1 & 2: 1
SUM OF ALL RESPONSES TO PHQ QUESTIONS 1-9: 1
4. FEELING TIRED OR HAVING LITTLE ENERGY: NOT AT ALL
9. THOUGHTS THAT YOU WOULD BE BETTER OFF DEAD, OR OF HURTING YOURSELF: NOT AT ALL
SUM OF ALL RESPONSES TO PHQ QUESTIONS 1-9: 1
8. MOVING OR SPEAKING SO SLOWLY THAT OTHER PEOPLE COULD HAVE NOTICED. OR THE OPPOSITE, BEING SO FIGETY OR RESTLESS THAT YOU HAVE BEEN MOVING AROUND A LOT MORE THAN USUAL: NOT AT ALL
SUM OF ALL RESPONSES TO PHQ QUESTIONS 1-9: 1
SUM OF ALL RESPONSES TO PHQ QUESTIONS 1-9: 1
2. FEELING DOWN, DEPRESSED OR HOPELESS: NOT AT ALL
5. POOR APPETITE OR OVEREATING: NOT AT ALL
10. IF YOU CHECKED OFF ANY PROBLEMS, HOW DIFFICULT HAVE THESE PROBLEMS MADE IT FOR YOU TO DO YOUR WORK, TAKE CARE OF THINGS AT HOME, OR GET ALONG WITH OTHER PEOPLE: NOT DIFFICULT AT ALL
1. LITTLE INTEREST OR PLEASURE IN DOING THINGS: SEVERAL DAYS
3. TROUBLE FALLING OR STAYING ASLEEP: NOT AT ALL

## 2025-01-28 NOTE — PROGRESS NOTES
Patient states she will get her flu vaccine at the pharmacy.     Doretha Gooden presents today for   Chief Complaint   Patient presents with    Medicare AWV    Dizziness     Comes and goes        Is someone accompanying this pt? no    Is the patient using any DME equipment during OV? No         1/28/2025     1:31 PM   PHQ-9    Little interest or pleasure in doing things 1   Feeling down, depressed, or hopeless 0   Trouble falling or staying asleep, or sleeping too much 0   Feeling tired or having little energy 0   Poor appetite or overeating 0   Feeling bad about yourself - or that you are a failure or have let yourself or your family down 0   Trouble concentrating on things, such as reading the newspaper or watching television 0   Moving or speaking so slowly that other people could have noticed. Or the opposite - being so fidgety or restless that you have been moving around a lot more than usual 0   Thoughts that you would be better off dead, or of hurting yourself in some way 0   PHQ-2 Score 1   PHQ-9 Total Score 1   If you checked off any problems, how difficult have these problems made it for you to do your work, take care of things at home, or get along with other people? 0        Who is the primary learner? Patient    What is the preferred language for health care of the primary learner? Somali    How does the primary learner prefer to learn new concepts? READING    Answered By patient    Relationship to Learner SELF         Health Maintenance reviewed and discussed and ordered per Provider.    Health Maintenance Due   Topic Date Due    Diabetic retinal exam  06/29/2017    Flu vaccine (1) 08/01/2024    COVID-19 Vaccine (1 - 2023-24 season) Never done    Annual Wellness Visit (Medicare)  10/12/2024    A1C test (Diabetic or Prediabetic)  10/15/2024    Diabetic Alb to Cr ratio (uACR) test  10/17/2024    GFR test (Diabetes, CKD 3-4, OR last GFR 15-59)  01/02/2025   .      \"Have you been to the ER, urgent care

## 2025-01-28 NOTE — PATIENT INSTRUCTIONS
anything you should change to stay safe as your body and health change.  If you tend to feel dizzy after you take medicine, avoid activity at that time. Try being active before you take your medicine. This will reduce your risk of falls.  If you plan to be active at home, make sure to clear your space before you get started. Remove things like TV cords, coffee tables, and throw rugs. It's safest to have plenty of space to move freely.  The key to getting more active is to take it slow and steady. Try to improve only a little bit at a time. Pick just one area to improve on at first. And if an activity hurts, stop and talk to your doctor.  Where can you learn more?  Go to https://www.[employer].net/patientEd and enter P600 to learn more about \"Learning About Being Active as an Older Adult.\"  Current as of: July 31, 2024  Content Version: 14.3  © 2024 [employer].   Care instructions adapted under license by [employer]. If you have questions about a medical condition or this instruction, always ask your healthcare professional. [employer] disclaims any warranty or liability for your use of this information.         Learning About Vision Tests  What are vision tests?     The four most common vision tests are visual acuity tests, refraction, visual field tests, and color vision tests.  Visual acuity (sharpness) tests  These tests are used:  To see if you need glasses or contact lenses.  To monitor an eye problem.  To check an eye injury.  Visual acuity tests are done as part of routine exams. You may also have this test when you get your 's license or apply for some types of jobs.  Visual field tests  These tests are used:  To check for vision loss in any area of your range of vision.  To screen for certain eye diseases.  To look for nerve damage after a stroke, head injury, or other problem that could reduce blood flow to the brain.  Refraction and color tests  A refraction test is done

## 2025-01-28 NOTE — PROGRESS NOTES
[address] [place] [id number]               [phone]      Doretha Gooden is a 64 y.o. female and presents with Medicare AWV and Dizziness (Comes and goes )       Assessment/Plan:    1. Former smoker  2. Type 2 diabetes mellitus with hyperglycemia, without long-term current use of insulin (HCC)  Assessment & Plan:   Chronic, not at goal (unstable), changes made today: advised to limit carbohydrates/sugar, medication adherence emphasized, and lifestyle modifications recommended  Orders:  -     Hemoglobin A1C; Future  -     Albumin/Creatinine Ratio, Urine; Future  3. Hx of multiple pulmonary nodules  4. Liver hemangioma  Assessment & Plan:     Repeat CT 9/2024 for subcentimeter liver hemangioma      5. Medicare annual wellness visit, subsequent  -     Comprehensive Metabolic Panel; Future  -     Hemoglobin A1C; Future  -     Albumin/Creatinine Ratio, Urine; Future  -     Lipid Panel; Future  -     CBC; Future  -     TSH; Future  6. Hyperlipidemia, unspecified hyperlipidemia type  -     Lipid Panel; Future  7. Vulvar intraepithelial neoplasia (LESLEE) grade 3  Assessment & Plan:      Monitored by specialist- no acute findings meriting change in the plan  Seeing GYN      8. Age-related osteoporosis without current pathological fracture  Assessment & Plan:   Chronic, at goal (stable), continue current treatment plan  9. History of vertigo  10. Anxiety and depression  Assessment & Plan:    Patient is very tearful; in a bad living situation. Therapy referral numbers provided to patient.         Follow up and disposition:   Return in about 3 months (around 4/28/2025) for diabetes.      Subjective:    Labs obtained prior to visit? No  Reviewed with patient? N/A        ROS:     Review of Systems   Constitutional:  Negative for chills, fatigue and fever.   HENT: Negative.     Respiratory: Negative.  Negative for cough, chest tightness, wheezing and stridor.    Cardiovascular:  Negative for

## 2025-01-29 DIAGNOSIS — E11.65 TYPE 2 DIABETES MELLITUS WITH HYPERGLYCEMIA, WITHOUT LONG-TERM CURRENT USE OF INSULIN (HCC): Primary | ICD-10-CM

## 2025-01-31 DIAGNOSIS — M81.0 OSTEOPOROSIS, UNSPECIFIED OSTEOPOROSIS TYPE, UNSPECIFIED PATHOLOGICAL FRACTURE PRESENCE: ICD-10-CM

## 2025-01-31 DIAGNOSIS — E78.49 OTHER HYPERLIPIDEMIA: ICD-10-CM

## 2025-01-31 RX ORDER — ATORVASTATIN CALCIUM 10 MG/1
10 TABLET, FILM COATED ORAL DAILY
Qty: 90 TABLET | Refills: 0 | Status: SHIPPED | OUTPATIENT
Start: 2025-01-31

## 2025-01-31 RX ORDER — ALENDRONATE SODIUM 70 MG/1
70 TABLET ORAL WEEKLY
Qty: 12 TABLET | Refills: 0 | Status: SHIPPED | OUTPATIENT
Start: 2025-01-31

## 2025-01-31 ASSESSMENT — ENCOUNTER SYMPTOMS
DIARRHEA: 0
COUGH: 0
RESPIRATORY NEGATIVE: 1
STRIDOR: 0
BACK PAIN: 0
CHEST TIGHTNESS: 0
WHEEZING: 0
ABDOMINAL PAIN: 0
CONSTIPATION: 0

## 2025-01-31 NOTE — ASSESSMENT & PLAN NOTE
Chronic, not at goal (unstable), changes made today: advised to limit carbohydrates/sugar, medication adherence emphasized, and lifestyle modifications recommended

## 2025-01-31 NOTE — ASSESSMENT & PLAN NOTE
Patient is very tearful; in a bad living situation. Therapy referral numbers provided to patient.

## 2025-03-06 RX ORDER — EMPAGLIFLOZIN AND METFORMIN HYDROCHLORIDE 12.5; 1 MG/1; MG/1
1 TABLET ORAL 2 TIMES DAILY
Qty: 180 TABLET | Refills: 0 | Status: SHIPPED | OUTPATIENT
Start: 2025-03-06

## 2025-03-25 ENCOUNTER — TELEPHONE (OUTPATIENT)
Facility: CLINIC | Age: 65
End: 2025-03-25

## 2025-03-25 DIAGNOSIS — E78.49 OTHER HYPERLIPIDEMIA: ICD-10-CM

## 2025-03-25 RX ORDER — ATORVASTATIN CALCIUM 10 MG/1
10 TABLET, FILM COATED ORAL DAILY
Qty: 90 TABLET | Refills: 0 | Status: SHIPPED | OUTPATIENT
Start: 2025-03-25 | End: 2025-03-27 | Stop reason: ALTCHOICE

## 2025-03-25 NOTE — TELEPHONE ENCOUNTER
PT called stating her pharmacy never received the most recent refill of Empagliflozin-metFORMIN HCl which was sent out on March 6th.

## 2025-03-27 ENCOUNTER — TELEPHONE (OUTPATIENT)
Facility: CLINIC | Age: 65
End: 2025-03-27

## 2025-03-27 DIAGNOSIS — E78.49 OTHER HYPERLIPIDEMIA: Primary | ICD-10-CM

## 2025-03-27 RX ORDER — ATORVASTATIN CALCIUM 20 MG/1
20 TABLET, FILM COATED ORAL DAILY
Qty: 90 TABLET | Refills: 1 | Status: SHIPPED | OUTPATIENT
Start: 2025-03-27

## 2025-03-27 NOTE — TELEPHONE ENCOUNTER
PT called inquiring about status of Rx. She also stated that she discussed with pcp that the strength needs to go up to 20mg. Pharmacy still remains the same.

## 2025-04-30 ENCOUNTER — HOSPITAL ENCOUNTER (OUTPATIENT)
Facility: HOSPITAL | Age: 65
Setting detail: SPECIMEN
Discharge: HOME | End: 2025-05-03
Payer: MEDICARE

## 2025-04-30 ENCOUNTER — OFFICE VISIT (OUTPATIENT)
Facility: CLINIC | Age: 65
End: 2025-04-30
Payer: MEDICARE

## 2025-04-30 VITALS
HEART RATE: 83 BPM | RESPIRATION RATE: 16 BRPM | DIASTOLIC BLOOD PRESSURE: 57 MMHG | TEMPERATURE: 97.8 F | BODY MASS INDEX: 28.07 KG/M2 | OXYGEN SATURATION: 91 % | WEIGHT: 164.4 LBS | HEIGHT: 64 IN | SYSTOLIC BLOOD PRESSURE: 123 MMHG

## 2025-04-30 DIAGNOSIS — Z87.898 HX OF MULTIPLE PULMONARY NODULES: ICD-10-CM

## 2025-04-30 DIAGNOSIS — Z13.31 POSITIVE DEPRESSION SCREENING: ICD-10-CM

## 2025-04-30 DIAGNOSIS — M81.0 OSTEOPOROSIS, UNSPECIFIED OSTEOPOROSIS TYPE, UNSPECIFIED PATHOLOGICAL FRACTURE PRESENCE: ICD-10-CM

## 2025-04-30 DIAGNOSIS — E11.65 TYPE 2 DIABETES MELLITUS WITH HYPERGLYCEMIA, WITHOUT LONG-TERM CURRENT USE OF INSULIN (HCC): ICD-10-CM

## 2025-04-30 DIAGNOSIS — D07.1 VULVAR INTRAEPITHELIAL NEOPLASIA (VIN) GRADE 3: ICD-10-CM

## 2025-04-30 DIAGNOSIS — M47.816 ARTHRITIS, LUMBAR SPINE: ICD-10-CM

## 2025-04-30 DIAGNOSIS — D18.03 LIVER HEMANGIOMA: ICD-10-CM

## 2025-04-30 DIAGNOSIS — E78.5 HYPERLIPIDEMIA, UNSPECIFIED HYPERLIPIDEMIA TYPE: ICD-10-CM

## 2025-04-30 DIAGNOSIS — Z87.891 FORMER SMOKER: ICD-10-CM

## 2025-04-30 DIAGNOSIS — F32.A ANXIETY AND DEPRESSION: ICD-10-CM

## 2025-04-30 DIAGNOSIS — F41.9 ANXIETY AND DEPRESSION: ICD-10-CM

## 2025-04-30 DIAGNOSIS — F43.21 GRIEF: ICD-10-CM

## 2025-04-30 DIAGNOSIS — E11.65 TYPE 2 DIABETES MELLITUS WITH HYPERGLYCEMIA, WITHOUT LONG-TERM CURRENT USE OF INSULIN (HCC): Primary | ICD-10-CM

## 2025-04-30 LAB
EST. AVERAGE GLUCOSE BLD GHB EST-MCNC: 212 MG/DL
HBA1C MFR BLD: 9 % (ref 4.2–5.6)

## 2025-04-30 PROCEDURE — 1160F RVW MEDS BY RX/DR IN RCRD: CPT | Performed by: NURSE PRACTITIONER

## 2025-04-30 PROCEDURE — 3052F HG A1C>EQUAL 8.0%<EQUAL 9.0%: CPT | Performed by: NURSE PRACTITIONER

## 2025-04-30 PROCEDURE — 1125F AMNT PAIN NOTED PAIN PRSNT: CPT | Performed by: NURSE PRACTITIONER

## 2025-04-30 PROCEDURE — 36415 COLL VENOUS BLD VENIPUNCTURE: CPT

## 2025-04-30 PROCEDURE — 99204 OFFICE O/P NEW MOD 45 MIN: CPT | Performed by: NURSE PRACTITIONER

## 2025-04-30 PROCEDURE — 1159F MED LIST DOCD IN RCRD: CPT | Performed by: NURSE PRACTITIONER

## 2025-04-30 PROCEDURE — 83036 HEMOGLOBIN GLYCOSYLATED A1C: CPT

## 2025-04-30 PROCEDURE — 1123F ACP DISCUSS/DSCN MKR DOCD: CPT | Performed by: NURSE PRACTITIONER

## 2025-04-30 RX ORDER — BACLOFEN 10 MG/1
10 TABLET ORAL NIGHTLY PRN
Qty: 15 TABLET | Refills: 0 | Status: SHIPPED | OUTPATIENT
Start: 2025-04-30

## 2025-04-30 RX ORDER — ALENDRONATE SODIUM 70 MG/1
70 TABLET ORAL WEEKLY
Qty: 12 TABLET | Refills: 0 | Status: SHIPPED | OUTPATIENT
Start: 2025-04-30

## 2025-04-30 ASSESSMENT — ENCOUNTER SYMPTOMS
DIARRHEA: 0
CHEST TIGHTNESS: 0
CONSTIPATION: 0
RESPIRATORY NEGATIVE: 1
WHEEZING: 0
ABDOMINAL PAIN: 0
STRIDOR: 0
BACK PAIN: 0
COUGH: 0
SHORTNESS OF BREATH: 0

## 2025-04-30 ASSESSMENT — PATIENT HEALTH QUESTIONNAIRE - PHQ9
SUM OF ALL RESPONSES TO PHQ QUESTIONS 1-9: 11
SUM OF ALL RESPONSES TO PHQ QUESTIONS 1-9: 11
1. LITTLE INTEREST OR PLEASURE IN DOING THINGS: NOT AT ALL
6. FEELING BAD ABOUT YOURSELF - OR THAT YOU ARE A FAILURE OR HAVE LET YOURSELF OR YOUR FAMILY DOWN: NOT AT ALL
3. TROUBLE FALLING OR STAYING ASLEEP: NEARLY EVERY DAY
7. TROUBLE CONCENTRATING ON THINGS, SUCH AS READING THE NEWSPAPER OR WATCHING TELEVISION: NEARLY EVERY DAY
2. FEELING DOWN, DEPRESSED OR HOPELESS: NEARLY EVERY DAY
8. MOVING OR SPEAKING SO SLOWLY THAT OTHER PEOPLE COULD HAVE NOTICED. OR THE OPPOSITE, BEING SO FIGETY OR RESTLESS THAT YOU HAVE BEEN MOVING AROUND A LOT MORE THAN USUAL: NOT AT ALL
4. FEELING TIRED OR HAVING LITTLE ENERGY: SEVERAL DAYS
SUM OF ALL RESPONSES TO PHQ QUESTIONS 1-9: 11
9. THOUGHTS THAT YOU WOULD BE BETTER OFF DEAD, OR OF HURTING YOURSELF: NOT AT ALL
10. IF YOU CHECKED OFF ANY PROBLEMS, HOW DIFFICULT HAVE THESE PROBLEMS MADE IT FOR YOU TO DO YOUR WORK, TAKE CARE OF THINGS AT HOME, OR GET ALONG WITH OTHER PEOPLE: NOT DIFFICULT AT ALL
5. POOR APPETITE OR OVEREATING: SEVERAL DAYS
SUM OF ALL RESPONSES TO PHQ QUESTIONS 1-9: 11

## 2025-04-30 NOTE — PROGRESS NOTES
Doretha Gooden presents today for   Chief Complaint   Patient presents with    Follow-up    Diabetes       Is someone accompanying this pt? No     Is the patient using any DME equipment during OV? No         4/30/2025     1:40 PM   PHQ-9    Little interest or pleasure in doing things 0   Feeling down, depressed, or hopeless 3   Trouble falling or staying asleep, or sleeping too much 3   Feeling tired or having little energy 1   Poor appetite or overeating 1   Feeling bad about yourself - or that you are a failure or have let yourself or your family down 0   Trouble concentrating on things, such as reading the newspaper or watching television 3   Moving or speaking so slowly that other people could have noticed. Or the opposite - being so fidgety or restless that you have been moving around a lot more than usual 0   Thoughts that you would be better off dead, or of hurting yourself in some way 0   PHQ-2 Score 3   PHQ-9 Total Score 11   If you checked off any problems, how difficult have these problems made it for you to do your work, take care of things at home, or get along with other people? 0        No question data found.     Health Maintenance reviewed and discussed and ordered per Provider.    Health Maintenance Due   Topic Date Due    Diabetic retinal exam  06/29/2017    Respiratory Syncytial Virus (RSV) Pregnant or age 60 yrs+ (1 - Risk 60-74 years 1-dose series) Never done    Lung Cancer Screening &/or Counseling  04/16/2025    A1C test (Diabetic or Prediabetic)  04/28/2025   .      \"Have you been to the ER, urgent care clinic since your last visit?  Hospitalized since your last visit?\"      No   “Have you seen or consulted any other health care providers outside our system since your last visit?”    No       “Have you had a diabetic eye exam?”    Appointment scheduled      Date of last diabetic eye exam: 6/29/2016           
  Respiratory: Negative.  Negative for cough, chest tightness, shortness of breath, wheezing and stridor.    Cardiovascular:  Negative for chest pain, palpitations and leg swelling.   Gastrointestinal:  Negative for abdominal pain, constipation and diarrhea.   Genitourinary:  Negative for genital sores, pelvic pain and vaginal bleeding.   Musculoskeletal:  Negative for arthralgias and back pain.   Neurological:  Negative for dizziness, seizures, syncope and headaches.   Psychiatric/Behavioral:  Positive for dysphoric mood. Negative for self-injury, sleep disturbance and suicidal ideas. The patient is nervous/anxious.          The problem list was updated as a part of today's visit.  Patient Active Problem List   Diagnosis    Hyperlipidemia    Anxiety and depression    Vitamin D deficiency    Type 2 diabetes mellitus with hyperglycemia (HCC)    Osteoporosis    Grief    Former smoker    Hx of multiple pulmonary nodules    Liver hemangioma    Polyp of colon    Arthritis, lumbar spine    Vulvar intraepithelial neoplasia (LESLEE) grade 3    Family history of pulmonary embolism    Venous reflux       The PSH, FH were reviewed.      SH:  Social History     Tobacco Use    Smoking status: Former     Current packs/day: 0.10     Average packs/day: 0.1 packs/day for 42.0 years (4.2 ttl pk-yrs)     Types: Cigarettes, Cigars    Smokeless tobacco: Never   Substance Use Topics    Alcohol use: Not Currently    Drug use: No       Medications/Allergies:  Current Outpatient Medications on File Prior to Visit   Medication Sig Dispense Refill    atorvastatin (LIPITOR) 20 MG tablet Take 1 tablet by mouth daily 90 tablet 1    SYNJARDY 12.5-1000 MG TABS Take 1 tablet by mouth twice daily 180 tablet 0    blood glucose test strips (ASCENSIA AUTODISC VI;ONE TOUCH ULTRA TEST VI) strip 1 each by In Vitro route daily As needed. 100 each 3    ciclopirox (PENLAC) 8 % solution Apply topically nightly. 6 mL 0    lidocaine (LIDODERM) 5 % APPLY ONE PATCH

## 2025-04-30 NOTE — ASSESSMENT & PLAN NOTE
Monitored by specialist- no acute findings meriting change in the plan  Seeing GYN with normal biopsy

## 2025-05-01 RX ORDER — EMPAGLIFLOZIN, METFORMIN HYDROCHLORIDE 25; 1000 MG/1; MG/1
1 TABLET, EXTENDED RELEASE ORAL DAILY
Qty: 30 TABLET | Refills: 2 | Status: SHIPPED | OUTPATIENT
Start: 2025-05-01